# Patient Record
Sex: FEMALE | Race: WHITE | NOT HISPANIC OR LATINO
[De-identification: names, ages, dates, MRNs, and addresses within clinical notes are randomized per-mention and may not be internally consistent; named-entity substitution may affect disease eponyms.]

---

## 2017-01-03 ENCOUNTER — APPOINTMENT (OUTPATIENT)
Dept: CT IMAGING | Facility: CLINIC | Age: 82
End: 2017-01-03

## 2017-01-03 ENCOUNTER — OUTPATIENT (OUTPATIENT)
Dept: OUTPATIENT SERVICES | Facility: HOSPITAL | Age: 82
LOS: 1 days | End: 2017-01-03
Payer: MEDICARE

## 2017-01-03 DIAGNOSIS — Z95.1 PRESENCE OF AORTOCORONARY BYPASS GRAFT: Chronic | ICD-10-CM

## 2017-01-03 DIAGNOSIS — I82.413 ACUTE EMBOLISM AND THROMBOSIS OF FEMORAL VEIN, BILATERAL: Chronic | ICD-10-CM

## 2017-01-03 DIAGNOSIS — Z00.8 ENCOUNTER FOR OTHER GENERAL EXAMINATION: ICD-10-CM

## 2017-01-03 DIAGNOSIS — Z95.0 PRESENCE OF CARDIAC PACEMAKER: Chronic | ICD-10-CM

## 2017-01-03 PROCEDURE — 74160 CT ABDOMEN W/CONTRAST: CPT

## 2017-01-03 PROCEDURE — 82565 ASSAY OF CREATININE: CPT

## 2018-06-12 ENCOUNTER — APPOINTMENT (OUTPATIENT)
Dept: ORTHOPEDIC SURGERY | Facility: CLINIC | Age: 83
End: 2018-06-12
Payer: MEDICARE

## 2018-06-12 VITALS
DIASTOLIC BLOOD PRESSURE: 69 MMHG | BODY MASS INDEX: 30.4 KG/M2 | WEIGHT: 161 LBS | SYSTOLIC BLOOD PRESSURE: 136 MMHG | HEIGHT: 61 IN | HEART RATE: 59 BPM

## 2018-06-12 DIAGNOSIS — Z82.61 FAMILY HISTORY OF ARTHRITIS: ICD-10-CM

## 2018-06-12 DIAGNOSIS — Z86.79 PERSONAL HISTORY OF OTHER DISEASES OF THE CIRCULATORY SYSTEM: ICD-10-CM

## 2018-06-12 DIAGNOSIS — Z60.2 PROBLEMS RELATED TO LIVING ALONE: ICD-10-CM

## 2018-06-12 DIAGNOSIS — Z78.9 OTHER SPECIFIED HEALTH STATUS: ICD-10-CM

## 2018-06-12 DIAGNOSIS — Z86.39 PERSONAL HISTORY OF OTHER ENDOCRINE, NUTRITIONAL AND METABOLIC DISEASE: ICD-10-CM

## 2018-06-12 DIAGNOSIS — C43.9 MALIGNANT MELANOMA OF SKIN, UNSPECIFIED: ICD-10-CM

## 2018-06-12 DIAGNOSIS — M17.0 BILATERAL PRIMARY OSTEOARTHRITIS OF KNEE: ICD-10-CM

## 2018-06-12 DIAGNOSIS — Z87.891 PERSONAL HISTORY OF NICOTINE DEPENDENCE: ICD-10-CM

## 2018-06-12 DIAGNOSIS — Z87.39 PERSONAL HISTORY OF OTHER DISEASES OF THE MUSCULOSKELETAL SYSTEM AND CONNECTIVE TISSUE: ICD-10-CM

## 2018-06-12 DIAGNOSIS — Z80.0 FAMILY HISTORY OF MALIGNANT NEOPLASM OF DIGESTIVE ORGANS: ICD-10-CM

## 2018-06-12 DIAGNOSIS — Z56.0 UNEMPLOYMENT, UNSPECIFIED: ICD-10-CM

## 2018-06-12 PROCEDURE — 73562 X-RAY EXAM OF KNEE 3: CPT | Mod: 50

## 2018-06-12 PROCEDURE — 20610 DRAIN/INJ JOINT/BURSA W/O US: CPT | Mod: 50

## 2018-06-12 PROCEDURE — 99203 OFFICE O/P NEW LOW 30 MIN: CPT | Mod: 25

## 2018-06-12 SDOH — SOCIAL STABILITY - SOCIAL INSECURITY: PROBLEMS RELATED TO LIVING ALONE: Z60.2

## 2018-06-12 SDOH — ECONOMIC STABILITY - INCOME SECURITY: UNEMPLOYMENT, UNSPECIFIED: Z56.0

## 2018-07-06 ENCOUNTER — APPOINTMENT (OUTPATIENT)
Dept: GASTROENTEROLOGY | Facility: CLINIC | Age: 83
End: 2018-07-06
Payer: MEDICARE

## 2018-07-06 VITALS
DIASTOLIC BLOOD PRESSURE: 60 MMHG | HEART RATE: 54 BPM | HEIGHT: 63 IN | BODY MASS INDEX: 27.29 KG/M2 | WEIGHT: 154 LBS | OXYGEN SATURATION: 99 % | SYSTOLIC BLOOD PRESSURE: 138 MMHG

## 2018-07-06 DIAGNOSIS — Q27.33 ARTERIOVENOUS MALFORMATION OF DIGESTIVE SYSTEM VESSEL: ICD-10-CM

## 2018-07-06 PROCEDURE — 99204 OFFICE O/P NEW MOD 45 MIN: CPT

## 2018-08-23 ENCOUNTER — APPOINTMENT (OUTPATIENT)
Dept: GASTROENTEROLOGY | Facility: HOSPITAL | Age: 83
End: 2018-08-23

## 2018-10-30 ENCOUNTER — APPOINTMENT (OUTPATIENT)
Dept: ORTHOPEDIC SURGERY | Facility: CLINIC | Age: 83
End: 2018-10-30

## 2018-10-31 ENCOUNTER — OTHER (OUTPATIENT)
Age: 83
End: 2018-10-31

## 2018-11-01 ENCOUNTER — CHART COPY (OUTPATIENT)
Age: 83
End: 2018-11-01

## 2018-11-05 ENCOUNTER — APPOINTMENT (OUTPATIENT)
Dept: GASTROENTEROLOGY | Facility: CLINIC | Age: 83
End: 2018-11-05
Payer: MEDICARE

## 2018-11-05 ENCOUNTER — OTHER (OUTPATIENT)
Age: 83
End: 2018-11-05

## 2018-11-05 VITALS
HEART RATE: 63 BPM | DIASTOLIC BLOOD PRESSURE: 80 MMHG | SYSTOLIC BLOOD PRESSURE: 110 MMHG | BODY MASS INDEX: 26.22 KG/M2 | WEIGHT: 148 LBS | TEMPERATURE: 97.4 F | HEIGHT: 63 IN | OXYGEN SATURATION: 97 %

## 2018-11-05 DIAGNOSIS — Z87.19 PERSONAL HISTORY OF OTHER DISEASES OF THE DIGESTIVE SYSTEM: ICD-10-CM

## 2018-11-05 DIAGNOSIS — K31.9 DISEASE OF STOMACH AND DUODENUM, UNSPECIFIED: ICD-10-CM

## 2018-11-05 DIAGNOSIS — K63.5 POLYP OF COLON: ICD-10-CM

## 2018-11-05 DIAGNOSIS — D50.0 IRON DEFICIENCY ANEMIA SECONDARY TO BLOOD LOSS (CHRONIC): ICD-10-CM

## 2018-11-05 DIAGNOSIS — Z92.89 PERSONAL HISTORY OF OTHER MEDICAL TREATMENT: ICD-10-CM

## 2018-11-05 PROCEDURE — 99215 OFFICE O/P EST HI 40 MIN: CPT

## 2018-11-05 RX ORDER — DABIGATRAN ETEXILATE MESYLATE 150 MG/1
150 CAPSULE ORAL
Refills: 0 | Status: ACTIVE | COMMUNITY

## 2018-11-23 ENCOUNTER — LABORATORY RESULT (OUTPATIENT)
Age: 83
End: 2018-11-23

## 2018-11-26 ENCOUNTER — OTHER (OUTPATIENT)
Age: 83
End: 2018-11-26

## 2019-10-02 PROBLEM — Z60.2 PERSON LIVING ALONE: Status: ACTIVE | Noted: 2018-06-12

## 2019-10-05 ENCOUNTER — TRANSCRIPTION ENCOUNTER (OUTPATIENT)
Age: 84
End: 2019-10-05

## 2020-03-19 ENCOUNTER — OUTPATIENT (OUTPATIENT)
Dept: OUTPATIENT SERVICES | Facility: HOSPITAL | Age: 85
LOS: 1 days | Discharge: HOME | End: 2020-03-19
Payer: MEDICARE

## 2020-03-19 DIAGNOSIS — J18.9 PNEUMONIA, UNSPECIFIED ORGANISM: ICD-10-CM

## 2020-04-10 ENCOUNTER — OUTPATIENT (OUTPATIENT)
Dept: OUTPATIENT SERVICES | Facility: HOSPITAL | Age: 85
LOS: 1 days | Discharge: HOME | End: 2020-04-10
Payer: MEDICARE

## 2020-04-10 DIAGNOSIS — J18.9 PNEUMONIA, UNSPECIFIED ORGANISM: ICD-10-CM

## 2020-05-08 ENCOUNTER — OUTPATIENT (OUTPATIENT)
Dept: OUTPATIENT SERVICES | Facility: HOSPITAL | Age: 85
LOS: 1 days | Discharge: HOME | End: 2020-05-08
Payer: MEDICARE

## 2020-05-08 DIAGNOSIS — R93.5 ABNORMAL FINDINGS ON DIAGNOSTIC IMAGING OF OTHER ABDOMINAL REGIONS, INCLUDING RETROPERITONEUM: ICD-10-CM

## 2020-05-30 ENCOUNTER — INPATIENT (INPATIENT)
Facility: HOSPITAL | Age: 85
LOS: 4 days | Discharge: HOME HEALTH | End: 2020-06-04
Attending: INTERNAL MEDICINE | Admitting: INTERNAL MEDICINE
Payer: MEDICARE

## 2020-05-30 VITALS
WEIGHT: 76.06 LBS | HEART RATE: 62 BPM | RESPIRATION RATE: 16 BRPM | SYSTOLIC BLOOD PRESSURE: 127 MMHG | DIASTOLIC BLOOD PRESSURE: 57 MMHG | TEMPERATURE: 99 F

## 2020-05-30 DIAGNOSIS — R60.1 GENERALIZED EDEMA: ICD-10-CM

## 2020-05-30 DIAGNOSIS — Z90.49 ACQUIRED ABSENCE OF OTHER SPECIFIED PARTS OF DIGESTIVE TRACT: Chronic | ICD-10-CM
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
## 2020-05-30 DIAGNOSIS — E11.9 TYPE 2 DIABETES MELLITUS WITHOUT COMPLICATIONS: ICD-10-CM

## 2020-05-30 DIAGNOSIS — K74.69 OTHER CIRRHOSIS OF LIVER: ICD-10-CM

## 2020-05-30 DIAGNOSIS — Z95.828 PRESENCE OF OTHER VASCULAR IMPLANTS AND GRAFTS: Chronic | ICD-10-CM

## 2020-05-30 LAB
ALBUMIN SERPL ELPH-MCNC: 3.2 G/DL — LOW (ref 3.3–5)
ALP SERPL-CCNC: 361 U/L — HIGH (ref 40–120)
ALT FLD-CCNC: 34 U/L — SIGNIFICANT CHANGE UP (ref 12–78)
ANION GAP SERPL CALC-SCNC: 9 MMOL/L — SIGNIFICANT CHANGE UP (ref 5–17)
APPEARANCE UR: CLEAR — SIGNIFICANT CHANGE UP
APTT BLD: 37.4 SEC — HIGH (ref 27.5–36.3)
AST SERPL-CCNC: 32 U/L — SIGNIFICANT CHANGE UP (ref 15–37)
BACTERIA # UR AUTO: ABNORMAL
BASOPHILS # BLD AUTO: 0.03 K/UL — SIGNIFICANT CHANGE UP (ref 0–0.2)
BASOPHILS NFR BLD AUTO: 0.8 % — SIGNIFICANT CHANGE UP (ref 0–2)
BILIRUB SERPL-MCNC: 1.3 MG/DL — HIGH (ref 0.2–1.2)
BILIRUB UR-MCNC: NEGATIVE — SIGNIFICANT CHANGE UP
BLD GP AB SCN SERPL QL: SIGNIFICANT CHANGE UP
BUN SERPL-MCNC: 47 MG/DL — HIGH (ref 7–23)
CALCIUM SERPL-MCNC: 9.5 MG/DL — SIGNIFICANT CHANGE UP (ref 8.5–10.1)
CHLORIDE SERPL-SCNC: 103 MMOL/L — SIGNIFICANT CHANGE UP (ref 96–108)
CO2 SERPL-SCNC: 26 MMOL/L — SIGNIFICANT CHANGE UP (ref 22–31)
COLOR SPEC: YELLOW — SIGNIFICANT CHANGE UP
CREAT SERPL-MCNC: 1.89 MG/DL — HIGH (ref 0.5–1.3)
DIFF PNL FLD: NEGATIVE — SIGNIFICANT CHANGE UP
EOSINOPHIL # BLD AUTO: 0.15 K/UL — SIGNIFICANT CHANGE UP (ref 0–0.5)
EOSINOPHIL NFR BLD AUTO: 4.2 % — SIGNIFICANT CHANGE UP (ref 0–6)
EPI CELLS # UR: SIGNIFICANT CHANGE UP
GLUCOSE BLDC GLUCOMTR-MCNC: 106 MG/DL — HIGH (ref 70–99)
GLUCOSE SERPL-MCNC: 127 MG/DL — HIGH (ref 70–99)
GLUCOSE UR QL: NEGATIVE MG/DL — SIGNIFICANT CHANGE UP
HCT VFR BLD CALC: 30.8 % — LOW (ref 34.5–45)
HGB BLD-MCNC: 9.4 G/DL — LOW (ref 11.5–15.5)
IMM GRANULOCYTES NFR BLD AUTO: 0.8 % — SIGNIFICANT CHANGE UP (ref 0–1.5)
INR BLD: 1.36 RATIO — HIGH (ref 0.88–1.16)
KETONES UR-MCNC: NEGATIVE — SIGNIFICANT CHANGE UP
LEUKOCYTE ESTERASE UR-ACNC: ABNORMAL
LYMPHOCYTES # BLD AUTO: 0.54 K/UL — LOW (ref 1–3.3)
LYMPHOCYTES # BLD AUTO: 15 % — SIGNIFICANT CHANGE UP (ref 13–44)
MAGNESIUM SERPL-MCNC: 2.6 MG/DL — SIGNIFICANT CHANGE UP (ref 1.6–2.6)
MCHC RBC-ENTMCNC: 29 PG — SIGNIFICANT CHANGE UP (ref 27–34)
MCHC RBC-ENTMCNC: 30.5 GM/DL — LOW (ref 32–36)
MCV RBC AUTO: 95.1 FL — SIGNIFICANT CHANGE UP (ref 80–100)
MONOCYTES # BLD AUTO: 0.37 K/UL — SIGNIFICANT CHANGE UP (ref 0–0.9)
MONOCYTES NFR BLD AUTO: 10.3 % — SIGNIFICANT CHANGE UP (ref 2–14)
NEUTROPHILS # BLD AUTO: 2.48 K/UL — SIGNIFICANT CHANGE UP (ref 1.8–7.4)
NEUTROPHILS NFR BLD AUTO: 68.9 % — SIGNIFICANT CHANGE UP (ref 43–77)
NITRITE UR-MCNC: NEGATIVE — SIGNIFICANT CHANGE UP
NRBC # BLD: 0 /100 WBCS — SIGNIFICANT CHANGE UP (ref 0–0)
PH UR: 6 — SIGNIFICANT CHANGE UP (ref 5–8)
PLATELET # BLD AUTO: 117 K/UL — LOW (ref 150–400)
POTASSIUM SERPL-MCNC: 4.4 MMOL/L — SIGNIFICANT CHANGE UP (ref 3.5–5.3)
POTASSIUM SERPL-SCNC: 4.4 MMOL/L — SIGNIFICANT CHANGE UP (ref 3.5–5.3)
PROT SERPL-MCNC: 7.1 GM/DL — SIGNIFICANT CHANGE UP (ref 6–8.3)
PROT UR-MCNC: NEGATIVE MG/DL — SIGNIFICANT CHANGE UP
PROTHROM AB SERPL-ACNC: 15.2 SEC — HIGH (ref 10–12.9)
RBC # BLD: 3.24 M/UL — LOW (ref 3.8–5.2)
RBC # FLD: 16.8 % — HIGH (ref 10.3–14.5)
RBC CASTS # UR COMP ASSIST: SIGNIFICANT CHANGE UP /HPF (ref 0–4)
SARS-COV-2 RNA SPEC QL NAA+PROBE: SIGNIFICANT CHANGE UP
SODIUM SERPL-SCNC: 138 MMOL/L — SIGNIFICANT CHANGE UP (ref 135–145)
SP GR SPEC: 1.01 — SIGNIFICANT CHANGE UP (ref 1.01–1.02)
UROBILINOGEN FLD QL: NEGATIVE MG/DL — SIGNIFICANT CHANGE UP
WBC # BLD: 3.6 K/UL — LOW (ref 3.8–10.5)
WBC # FLD AUTO: 3.6 K/UL — LOW (ref 3.8–10.5)
WBC UR QL: SIGNIFICANT CHANGE UP

## 2020-05-30 RX ORDER — SPIRONOLACTONE 25 MG/1
50 TABLET, FILM COATED ORAL DAILY
Refills: 0 | Status: DISCONTINUED | OUTPATIENT
Start: 2020-05-30 | End: 2020-06-04

## 2020-05-30 RX ORDER — DEXTROSE 50 % IN WATER 50 %
25 SYRINGE (ML) INTRAVENOUS ONCE
Refills: 0 | Status: DISCONTINUED | OUTPATIENT
Start: 2020-05-30 | End: 2020-06-04

## 2020-05-30 RX ORDER — CHOLECALCIFEROL (VITAMIN D3) 125 MCG
1000 CAPSULE ORAL DAILY
Refills: 0 | Status: DISCONTINUED | OUTPATIENT
Start: 2020-05-30 | End: 2020-06-04

## 2020-05-30 RX ORDER — IOHEXOL 300 MG/ML
30 INJECTION, SOLUTION INTRAVENOUS ONCE
Refills: 0 | Status: COMPLETED | OUTPATIENT
Start: 2020-05-30 | End: 2020-05-30

## 2020-05-30 RX ORDER — ASPIRIN/CALCIUM CARB/MAGNESIUM 324 MG
81 TABLET ORAL DAILY
Refills: 0 | Status: DISCONTINUED | OUTPATIENT
Start: 2020-05-31 | End: 2020-06-04

## 2020-05-30 RX ORDER — NYSTATIN CREAM 100000 [USP'U]/G
1 CREAM TOPICAL
Refills: 0 | Status: DISCONTINUED | OUTPATIENT
Start: 2020-05-30 | End: 2020-06-04

## 2020-05-30 RX ORDER — SENNA PLUS 8.6 MG/1
0 TABLET ORAL
Qty: 0 | Refills: 0 | DISCHARGE

## 2020-05-30 RX ORDER — INSULIN LISPRO 100/ML
VIAL (ML) SUBCUTANEOUS AT BEDTIME
Refills: 0 | Status: DISCONTINUED | OUTPATIENT
Start: 2020-05-30 | End: 2020-06-04

## 2020-05-30 RX ORDER — HEPARIN SODIUM 5000 [USP'U]/ML
5000 INJECTION INTRAVENOUS; SUBCUTANEOUS EVERY 12 HOURS
Refills: 0 | Status: DISCONTINUED | OUTPATIENT
Start: 2020-05-30 | End: 2020-06-04

## 2020-05-30 RX ORDER — METOPROLOL TARTRATE 50 MG
25 TABLET ORAL
Refills: 0 | Status: DISCONTINUED | OUTPATIENT
Start: 2020-05-30 | End: 2020-06-04

## 2020-05-30 RX ORDER — CLOPIDOGREL BISULFATE 75 MG/1
75 TABLET, FILM COATED ORAL DAILY
Refills: 0 | Status: DISCONTINUED | OUTPATIENT
Start: 2020-05-30 | End: 2020-06-04

## 2020-05-30 RX ORDER — ATORVASTATIN CALCIUM 80 MG/1
20 TABLET, FILM COATED ORAL AT BEDTIME
Refills: 0 | Status: DISCONTINUED | OUTPATIENT
Start: 2020-05-30 | End: 2020-06-04

## 2020-05-30 RX ORDER — ALBUTEROL 90 UG/1
2 AEROSOL, METERED ORAL EVERY 6 HOURS
Refills: 0 | Status: DISCONTINUED | OUTPATIENT
Start: 2020-05-30 | End: 2020-06-04

## 2020-05-30 RX ORDER — POLYETHYLENE GLYCOL 3350 17 G/17G
17 POWDER, FOR SOLUTION ORAL
Refills: 0 | Status: DISCONTINUED | OUTPATIENT
Start: 2020-05-30 | End: 2020-06-04

## 2020-05-30 RX ORDER — SODIUM CHLORIDE 9 MG/ML
1000 INJECTION, SOLUTION INTRAVENOUS
Refills: 0 | Status: DISCONTINUED | OUTPATIENT
Start: 2020-05-30 | End: 2020-06-04

## 2020-05-30 RX ORDER — ACETAMINOPHEN 500 MG
2 TABLET ORAL
Qty: 0 | Refills: 0 | DISCHARGE

## 2020-05-30 RX ORDER — DEXTROSE 50 % IN WATER 50 %
15 SYRINGE (ML) INTRAVENOUS ONCE
Refills: 0 | Status: DISCONTINUED | OUTPATIENT
Start: 2020-05-30 | End: 2020-06-04

## 2020-05-30 RX ORDER — PANTOPRAZOLE SODIUM 20 MG/1
40 TABLET, DELAYED RELEASE ORAL
Refills: 0 | Status: DISCONTINUED | OUTPATIENT
Start: 2020-05-30 | End: 2020-06-04

## 2020-05-30 RX ORDER — TAMSULOSIN HYDROCHLORIDE 0.4 MG/1
0.4 CAPSULE ORAL AT BEDTIME
Refills: 0 | Status: DISCONTINUED | OUTPATIENT
Start: 2020-05-30 | End: 2020-06-04

## 2020-05-30 RX ORDER — MORPHINE SULFATE 50 MG/1
4 CAPSULE, EXTENDED RELEASE ORAL ONCE
Refills: 0 | Status: DISCONTINUED | OUTPATIENT
Start: 2020-05-30 | End: 2020-05-30

## 2020-05-30 RX ORDER — SODIUM CHLORIDE 9 MG/ML
1000 INJECTION INTRAMUSCULAR; INTRAVENOUS; SUBCUTANEOUS ONCE
Refills: 0 | Status: COMPLETED | OUTPATIENT
Start: 2020-05-30 | End: 2020-05-30

## 2020-05-30 RX ORDER — INSULIN LISPRO 100/ML
VIAL (ML) SUBCUTANEOUS
Refills: 0 | Status: DISCONTINUED | OUTPATIENT
Start: 2020-05-30 | End: 2020-06-04

## 2020-05-30 RX ORDER — ASCORBIC ACID 60 MG
500 TABLET,CHEWABLE ORAL DAILY
Refills: 0 | Status: DISCONTINUED | OUTPATIENT
Start: 2020-05-30 | End: 2020-06-04

## 2020-05-30 RX ORDER — GLUCAGON INJECTION, SOLUTION 0.5 MG/.1ML
1 INJECTION, SOLUTION SUBCUTANEOUS ONCE
Refills: 0 | Status: DISCONTINUED | OUTPATIENT
Start: 2020-05-30 | End: 2020-06-04

## 2020-05-30 RX ORDER — ONDANSETRON 8 MG/1
4 TABLET, FILM COATED ORAL ONCE
Refills: 0 | Status: COMPLETED | OUTPATIENT
Start: 2020-05-30 | End: 2020-05-30

## 2020-05-30 RX ORDER — DEXTROSE 50 % IN WATER 50 %
12.5 SYRINGE (ML) INTRAVENOUS ONCE
Refills: 0 | Status: DISCONTINUED | OUTPATIENT
Start: 2020-05-30 | End: 2020-06-04

## 2020-05-30 RX ADMIN — MORPHINE SULFATE 4 MILLIGRAM(S): 50 CAPSULE, EXTENDED RELEASE ORAL at 17:02

## 2020-05-30 RX ADMIN — IOHEXOL 30 MILLILITER(S): 300 INJECTION, SOLUTION INTRAVENOUS at 17:02

## 2020-05-30 RX ADMIN — SODIUM CHLORIDE 1000 MILLILITER(S): 9 INJECTION INTRAMUSCULAR; INTRAVENOUS; SUBCUTANEOUS at 17:02

## 2020-05-30 RX ADMIN — ATORVASTATIN CALCIUM 20 MILLIGRAM(S): 80 TABLET, FILM COATED ORAL at 21:29

## 2020-05-30 RX ADMIN — TAMSULOSIN HYDROCHLORIDE 0.4 MILLIGRAM(S): 0.4 CAPSULE ORAL at 21:28

## 2020-05-30 RX ADMIN — SPIRONOLACTONE 50 MILLIGRAM(S): 25 TABLET, FILM COATED ORAL at 21:26

## 2020-05-30 RX ADMIN — Medication 500 MILLIGRAM(S): at 21:29

## 2020-05-30 RX ADMIN — Medication 1 ENEMA: at 22:09

## 2020-05-30 RX ADMIN — ONDANSETRON 4 MILLIGRAM(S): 8 TABLET, FILM COATED ORAL at 17:02

## 2020-05-30 NOTE — H&P ADULT - NSHPLABSRESULTS_GEN_ALL_CORE
9.4                  138  | 26   | 47           3.60  >-----------< 117     ------------------------< 127                   30.8                 4.4  | 103  | 1.89                                         Ca 9.5   Mg 2.6   Ph x      PT/INR - ( 30 May 2020 16:58 )   PT: 15.2 sec;   INR: 1.36 ratio         PTT - ( 30 May 2020 16:58 )  PTT:37.4 sec  EKG - A. fib, RBBB LAHB  rate 61    < from: CT Abdomen and Pelvis No Cont (05.30.20 @ 17:44) >      EXAM:  CT ABDOMEN AND PELVIS                            PROCEDURE DATE:  05/30/2020          INTERPRETATION:  Clinical Information: Abdominal pain    Comparison: None    Technique: Noncontrast CT abdomen and pelvis with Axial, sagittal, coronal reconstructions. Oral contrast administered.    Findings:    Imaged chest: Severe cardiomegaly. Atherosclerosis of the coronary arteries. Defibrillator. Trace bilateral pleural effusions.  Hepatobiliary: Cirrhosis of the liver. Biliary tree unremarkable.Cholelithiasis without distention or inflammation.  Spleen: Unremarkable.  Pancreas: Unremarkable.  Adrenal glands: Unremarkable.  Urinary: Left kidney is atrophic. Mild cystic bladder wall thickening.  Reproductive organs: unremarkable.    Gastrointestinal: No bowel obstruction.    Peritoneum: Trace ascites.  Vasculature: Severe atherosclerotic changes. Status post aorto-bilateral common femoral bypass grafts.  Retroperitoneum: Unremarkable.  Subcutaneous tissues: Anasarca. Prior abdominal wall surgery.  Neuromusculoskeletal: Severe degenerative changes.    Impression:   -Severe cardiomegaly and coronary artery atherosclerosis.  -Severe abdominal atherosclerotic disease. Status post aorto-bilateral common femoral bypass grafts.  -Hepatomegaly and cirrhosis.  -Mild urinary bladder wall thickening, correlate for cystitis.  -Severe degenerative changes of the spine.    < end of copied text >

## 2020-05-30 NOTE — H&P ADULT - NSHPPHYSICALEXAM_GEN_ALL_CORE
General: A/ox 3, No acute Distress  skin : Normal  Head. Anisha. No lacerations  Neck: Supple, NO JVD  Cardiac: S1 S2, No M/R/G  Pulmonary: CTAP, Breathing unlabored, No Rhonchi/Rales/Wheezing  Abdomen: Soft, Non -tender, +BS x 4 quads  Neuro: A/o x 3, No focal deficits. Normal Motor and sensory exam  Vascular: Normal distal pulses.  Extremities: . No rashes.  edema of abd wall and legs  Decubiti ; None

## 2020-05-30 NOTE — ED ADULT NURSE NOTE - NSIMPLEMENTINTERV_GEN_ALL_ED
Implemented All Fall with Harm Risk Interventions:  [place] to call system. Call bell, personal items and telephone within reach. Instruct patient to call for assistance. Room bathroom lighting operational. Non-slip footwear when patient is off stretcher. Physically safe environment: no spills, clutter or unnecessary equipment. Stretcher in lowest position, wheels locked, appropriate side rails in place. Provide visual cue, wrist band, yellow gown, etc. Monitor gait and stability. Monitor for mental status changes and reorient to person, place, and time. Review medications for side effects contributing to fall risk. Reinforce activity limits and safety measures with patient and family. Provide visual clues: red socks.

## 2020-05-30 NOTE — ED ADULT TRIAGE NOTE - CHIEF COMPLAINT QUOTE
pt was send from [facility] for impaction and abdominal  obstruction.

## 2020-05-30 NOTE — H&P ADULT - NSICDXPASTMEDICALHX_GEN_ALL_CORE_FT
----- Message from Katy Majano MD sent at 10/26/2017 12:57 PM CDT -----  The vaginal cultures were negative for infection.      PAST MEDICAL HISTORY:  Cardiac complication Cad s/p cabg    Falls     H/O cirrhosis

## 2020-05-30 NOTE — ED PROVIDER NOTE - CLINICAL SUMMARY MEDICAL DECISION MAKING FREE TEXT BOX
Bowel obstruction vs Constipation Bowel obstruction vs Constipation. ct shows neither, rather appears to be in liver cirrhosis. will admit for gi eval and pain control Bowel obstruction vs Constipation. ct shows neither, rather appears to be in liver cirrhosis. will admit for gi eval and pain control  I read ekg as afib rate 61, no st elevation or depression, rbbb, lafb, inferior qs, qtc 491.

## 2020-05-30 NOTE — H&P ADULT - HISTORY OF PRESENT ILLNESS
· HPI Objective Statement: 85 F y/o hx of bl aorto femoral bypass, cad, htn, gerd presents with complains of abdominal pain. Pt states she has noticed her abdomen has been distended and has gotten progressively worse with time. She states she has been moving bowel normally. She states she had COVID for an estimation of 5 weeks but at this time does not have it anymore.

## 2020-05-30 NOTE — H&P ADULT - NSHPREVIEWOFSYSTEMS_GEN_ALL_CORE
REVIEW OF SYSTEMS:    CONSTITUTIONAL: Generalized  weakness, fevers or chills. Anasarca  EYES/ENT: No visual changes;  No vertigo or throat pain   NECK: No pain or stiffness  RESPIRATORY: No cough, wheezing, hemoptysis; No shortness of breath  CARDIOVASCULAR: No chest pain or palpitations [ ] CHF [ ] MI [ ] CABG [x ]  GASTROINTESTINAL: No abdominal or epigastric pain. No nausea, vomiting, or hematemesis; No diarrhea or constipation. No melena or hematochezia. [ ]abdominal surgery [ ] prostrate problems   GENITOURINARY: No dysuria, frequency or hematuria   NEUROLOGICAL: No numbness or weakness. No hx of seizures  SKIN: No itching, burning, rashes, or lesions   All other review of systems is negative unless indicated above.

## 2020-05-30 NOTE — ED PROVIDER NOTE - PHYSICAL EXAMINATION
Gen: Alert, NAD  Head: NC, AT   Eyes: PERRL, EOMI, normal lids/conjunctiva  ENT: normal hearing, patent oropharynx without erythema/exudate, uvula midline  Neck: supple, no tenderness, Trachea midline  Pulm: Bilateral BS, normal resp effort, no wheeze/stridor/retractions  CV: RRR, no M/R/G, 2+ radial and dp pulses bl, no edema  Abd: abdomen distended, tympanic, and tender to palpations   Mskel: extremities x4 with normal ROM and no joint effusions. no ctl spine ttp.   Skin: no rash, no bruising   Neuro: AAOx3, no sensory/motor deficits, CN 2-12 intact

## 2020-05-30 NOTE — ED PROVIDER NOTE - OBJECTIVE STATEMENT
86 y/o F  presents with complains of abdominal pain. Pt states she has noticed her abdomen has been distended and has gotten progressively worse with time. She states she has been moving bowel normally. She states she had COVID for an estimation of 5 weeks but at this time does not have it anymore. 85 F y/o hx of bl aorto femoral bypass, cad, htn, gerd presents with complains of abdominal pain. Pt states she has noticed her abdomen has been distended and has gotten progressively worse with time. She states she has been moving bowel normally. She states she had COVID for an estimation of 5 weeks but at this time does not have it anymore.

## 2020-05-30 NOTE — H&P ADULT - ASSESSMENT
anasarca   CAd, s/p CABG   PAd s/p [place] aorto femoral Bypass   Diabetes  HTn   Recentt Covid infection

## 2020-05-31 LAB
A1C WITH ESTIMATED AVERAGE GLUCOSE RESULT: 6.4 % — HIGH (ref 4–5.6)
ANION GAP SERPL CALC-SCNC: 8 MMOL/L — SIGNIFICANT CHANGE UP (ref 5–17)
BUN SERPL-MCNC: 46 MG/DL — HIGH (ref 7–23)
CALCIUM SERPL-MCNC: 9.6 MG/DL — SIGNIFICANT CHANGE UP (ref 8.5–10.1)
CHLORIDE SERPL-SCNC: 104 MMOL/L — SIGNIFICANT CHANGE UP (ref 96–108)
CO2 SERPL-SCNC: 26 MMOL/L — SIGNIFICANT CHANGE UP (ref 22–31)
CREAT SERPL-MCNC: 1.64 MG/DL — HIGH (ref 0.5–1.3)
ESTIMATED AVERAGE GLUCOSE: 137 MG/DL — HIGH (ref 68–114)
GLUCOSE BLDC GLUCOMTR-MCNC: 109 MG/DL — HIGH (ref 70–99)
GLUCOSE BLDC GLUCOMTR-MCNC: 143 MG/DL — HIGH (ref 70–99)
GLUCOSE BLDC GLUCOMTR-MCNC: 153 MG/DL — HIGH (ref 70–99)
GLUCOSE BLDC GLUCOMTR-MCNC: 154 MG/DL — HIGH (ref 70–99)
GLUCOSE SERPL-MCNC: 95 MG/DL — SIGNIFICANT CHANGE UP (ref 70–99)
HCT VFR BLD CALC: 30.7 % — LOW (ref 34.5–45)
HCV AB S/CO SERPL IA: 0.3 S/CO — SIGNIFICANT CHANGE UP (ref 0–0.99)
HCV AB SERPL-IMP: SIGNIFICANT CHANGE UP
HGB BLD-MCNC: 9.1 G/DL — LOW (ref 11.5–15.5)
MCHC RBC-ENTMCNC: 29.1 PG — SIGNIFICANT CHANGE UP (ref 27–34)
MCHC RBC-ENTMCNC: 29.6 GM/DL — LOW (ref 32–36)
MCV RBC AUTO: 98.1 FL — SIGNIFICANT CHANGE UP (ref 80–100)
NRBC # BLD: 0 /100 WBCS — SIGNIFICANT CHANGE UP (ref 0–0)
PLATELET # BLD AUTO: 115 K/UL — LOW (ref 150–400)
POTASSIUM SERPL-MCNC: 4.1 MMOL/L — SIGNIFICANT CHANGE UP (ref 3.5–5.3)
POTASSIUM SERPL-SCNC: 4.1 MMOL/L — SIGNIFICANT CHANGE UP (ref 3.5–5.3)
RBC # BLD: 3.13 M/UL — LOW (ref 3.8–5.2)
RBC # FLD: 16.9 % — HIGH (ref 10.3–14.5)
SODIUM SERPL-SCNC: 138 MMOL/L — SIGNIFICANT CHANGE UP (ref 135–145)
WBC # BLD: 3.42 K/UL — LOW (ref 3.8–10.5)
WBC # FLD AUTO: 3.42 K/UL — LOW (ref 3.8–10.5)

## 2020-05-31 RX ORDER — ACETAMINOPHEN 500 MG
650 TABLET ORAL ONCE
Refills: 0 | Status: COMPLETED | OUTPATIENT
Start: 2020-05-31 | End: 2020-05-31

## 2020-05-31 RX ORDER — SIMETHICONE 80 MG/1
80 TABLET, CHEWABLE ORAL
Refills: 0 | Status: DISCONTINUED | OUTPATIENT
Start: 2020-05-31 | End: 2020-06-04

## 2020-05-31 RX ADMIN — Medication 1000 UNIT(S): at 12:06

## 2020-05-31 RX ADMIN — CLOPIDOGREL BISULFATE 75 MILLIGRAM(S): 75 TABLET, FILM COATED ORAL at 12:06

## 2020-05-31 RX ADMIN — ALBUTEROL 2 PUFF(S): 90 AEROSOL, METERED ORAL at 21:54

## 2020-05-31 RX ADMIN — SPIRONOLACTONE 50 MILLIGRAM(S): 25 TABLET, FILM COATED ORAL at 05:42

## 2020-05-31 RX ADMIN — TAMSULOSIN HYDROCHLORIDE 0.4 MILLIGRAM(S): 0.4 CAPSULE ORAL at 21:27

## 2020-05-31 RX ADMIN — Medication 25 MILLIGRAM(S): at 18:23

## 2020-05-31 RX ADMIN — NYSTATIN CREAM 1 APPLICATION(S): 100000 CREAM TOPICAL at 05:43

## 2020-05-31 RX ADMIN — PANTOPRAZOLE SODIUM 40 MILLIGRAM(S): 20 TABLET, DELAYED RELEASE ORAL at 08:02

## 2020-05-31 RX ADMIN — SIMETHICONE 80 MILLIGRAM(S): 80 TABLET, CHEWABLE ORAL at 18:21

## 2020-05-31 RX ADMIN — Medication 25 MILLIGRAM(S): at 05:42

## 2020-05-31 RX ADMIN — ATORVASTATIN CALCIUM 20 MILLIGRAM(S): 80 TABLET, FILM COATED ORAL at 21:27

## 2020-05-31 RX ADMIN — Medication 650 MILLIGRAM(S): at 21:27

## 2020-05-31 RX ADMIN — Medication 81 MILLIGRAM(S): at 12:06

## 2020-05-31 RX ADMIN — NYSTATIN CREAM 1 APPLICATION(S): 100000 CREAM TOPICAL at 16:35

## 2020-05-31 RX ADMIN — Medication 500 MILLIGRAM(S): at 12:06

## 2020-05-31 NOTE — CONSULT NOTE ADULT - ASSESSMENT
HPI:  · HPI Objective Statement: 85 F y/o hx of bl aorto femoral bypass, cad, htn, gerd presents with complains of abdominal pain. Pt states she has noticed her abdomen has been distended and has gotten progressively worse with time. She states she has been moving bowel normally. She states she had COVID for an estimation of 5 weeks but at this time does not have it anymore. (30 May 2020 19:18)  --------------------- As Above ----------------------------------  Patient is a poor historian . Patient presents with upper abdominal pain best described as bloating. She feels like someone is trying to push out from inside the stomach. Started (?). History of diarrhea alternating with constipation. Patient denies having any BMs while in the hospital  Patient had a CT Scan on October 10/15/19 c/w cirrhosis On admission, patient had a CT Scan which revealed cirrhosis with anasarca. Mild amount of ascites is present.   Denies having alicia prior liver disease history. Denies ETOH abuse.  Patient denies having a colonoscopy  See labs / CT scan    Abdominal pain - Distention (+) , r/o ileus, food intolerance , obstruction, PUD  1) KUB  2) ? EGD  3) simethicone 4) clear liquid for now  Cirrhosis - probably secondary to NAFLD VS congestive liver 1) Additional blood work 2) old chart HPI:  · HPI Objective Statement: 85 F y/o hx of bl aorto femoral bypass, cad, htn, gerd presents with complains of abdominal pain. Pt states she has noticed her abdomen has been distended and has gotten progressively worse with time. She states she has been moving bowel normally. She states she had COVID for an estimation of 5 weeks but at this time does not have it anymore. (30 May 2020 19:18)  --------------------- As Above ----------------------------------  Patient is a poor historian . Patient presents with upper abdominal pain best described as bloating. She feels like someone is trying to push out from inside the stomach. Started (?). History of diarrhea alternating with constipation. Patient denies having any BMs while in the hospital  Patient had a CT Scan on October 10/15/19 c/w cirrhosis On admission, patient had a CT Scan which revealed cirrhosis with anasarca. Mild amount of ascites is present.   Denies having alicia prior liver disease history. Denies ETOH abuse.  Patient denies having a colonoscopy  See labs / CT scan    Abdominal pain - Distention (+) , r/o ileus, food intolerance , obstruction, PUD  1) KUB  2) ? EGD  3) simethicone 4) clear liquid for now 5) D/C ASA  Cirrhosis - probably secondary to NAFLD VS congestive liver 1) Additional blood work 2) old chart

## 2020-05-31 NOTE — PROGRESS NOTE ADULT - SUBJECTIVE AND OBJECTIVE BOX
Patient is a 85y old  Female who presents with a chief complaint of Anasarca, Diabetes, Cirrhosis of liver, HTN (30 May 2020 19:18)  Bladder scan 108 ml only.    Fleet enema did not produce much result..    patient has no acute distress. . Vitals stable.    INTERVAL HPI/OVERNIGHT EVENTS:  PAST MEDICAL & SURGICAL HISTORY:  H/O cirrhosis  Falls  Cardiac complication: Cad s/p cabg  H/O aorto-femoral bypass: Bilateral  S/P cholecystectomy      MEDICATIONS  (STANDING):  ascorbic acid 500 milliGRAM(s) Oral daily  aspirin enteric coated 81 milliGRAM(s) Oral daily  atorvastatin 20 milliGRAM(s) Oral at bedtime  cholecalciferol 1000 Unit(s) Oral daily  clopidogrel Tablet 75 milliGRAM(s) Oral daily  dextrose 5%. 1000 milliLiter(s) (50 mL/Hr) IV Continuous <Continuous>  dextrose 50% Injectable 12.5 Gram(s) IV Push once  dextrose 50% Injectable 25 Gram(s) IV Push once  dextrose 50% Injectable 25 Gram(s) IV Push once  heparin   Injectable 5000 Unit(s) SubCutaneous every 12 hours  insulin lispro (HumaLOG) corrective regimen sliding scale   SubCutaneous three times a day before meals  insulin lispro (HumaLOG) corrective regimen sliding scale   SubCutaneous at bedtime  metoprolol tartrate 25 milliGRAM(s) Oral two times a day  nystatin Powder 1 Application(s) Topical two times a day  pantoprazole    Tablet 40 milliGRAM(s) Oral before breakfast  spironolactone 50 milliGRAM(s) Oral daily  tamsulosin 0.4 milliGRAM(s) Oral at bedtime    MEDICATIONS  (PRN):  ALBUTerol    90 MICROgram(s) HFA Inhaler 2 Puff(s) Inhalation every 6 hours PRN Shortness of Breath and/or Wheezing  dextrose 40% Gel 15 Gram(s) Oral once PRN Blood Glucose LESS THAN 70 milliGRAM(s)/deciliter  glucagon  Injectable 1 milliGRAM(s) IntraMuscular once PRN Glucose LESS THAN 70 milligrams/deciliter  polyethylene glycol 3350 17 Gram(s) Oral two times a day PRN Constipation      Allergies    penicillin (Unknown)    Intolerances        REVIEW OF SYSTEMS:  CONSTITUTIONAL: No fever, weight loss, or fatigue  EYES: No eye pain, visual disturbances, or discharge  ENMT:  No difficulty hearing, tinnitus, vertigo; No sinus or throat pain  NECK: No pain or stiffness  BREASTS: No pain, masses, or nipple discharge  RESPIRATORY: No cough, wheezing, chills or hemoptysis; No shortness of breath  CARDIOVASCULAR: No chest pain, palpitations, dizziness, or leg swelling  GASTROINTESTINAL: No abdominal or epigastric pain. No nausea, vomiting, or hematemesis; No diarrhea or constipation. No melena or hematochezia.  GENITOURINARY: No dysuria, frequency, hematuria, or incontinence  NEUROLOGICAL: No headaches, memory loss, loss of strength, numbness, or tremors  SKIN: No itching, burning, rashes, or lesions   LYMPH NODES: No enlarged glands  ENDOCRINE: No heat or cold intolerance; No hair loss  MUSCULOSKELETAL: No joint pain or swelling; No muscle, back, or extremity pain  PSYCHIATRIC: No depression, anxiety, mood swings, or difficulty sleeping  HEME/LYMPH: No easy bruising, or bleeding gums  ALLERY AND IMMUNOLOGIC: No hives or eczema    Vital Signs Last 24 Hrs  T(C): 36.6 (31 May 2020 05:16), Max: 37 (30 May 2020 16:13)  T(F): 97.9 (31 May 2020 05:16), Max: 98.6 (30 May 2020 16:13)  HR: 70 (31 May 2020 05:16) (62 - 76)  BP: 111/51 (31 May 2020 05:16) (111/51 - 128/63)  BP(mean): --  RR: 18 (31 May 2020 05:16) (16 - 18)  SpO2: 97% (31 May 2020 05:16) (97% - 98%)    PHYSICAL EXAM:  GENERAL: NAD, well-groomed, well-developed  HEAD:  Atraumatic, Normocephalic  EYES: EOMI, PERRLA, conjunctiva and sclera clear  ENMT: No tonsillar erythema, exudates, or enlargement; Moist mucous membranes, Good dentition, No lesions  NECK: Supple, No JVD, Normal thyroid  NERVOUS SYSTEM:  Alert & Oriented X3, Good concentration; Motor Strength 5/5 B/L upper and lower extremities; DTRs 2+ intact and symmetric  CHEST/LUNG: Clear to percussion bilaterally; No rales, rhonchi, wheezing, or rubs  HEART: Regular rate and rhythm; No murmurs, rubs, or gallops  ABDOMEN: Soft, Nontender, Nondistended; Bowel sounds present  EXTREMITIES:  2+ Peripheral Pulses, No clubbing, cyanosis, or edema  LYMPH: No lymphadenopathy noted  SKIN: No rashes or lesions    LABS:                        9.1    3.42  )-----------( 115      ( 31 May 2020 07:14 )             30.7     05-31    138  |  104  |  46<H>  ----------------------------<  95  4.1   |  26  |  1.64<H>    Ca    9.6      31 May 2020 07:14  Mg     2.6         TPro  7.1  /  Alb  3.2<L>  /  TBili  1.3<H>  /  DBili  x   /  AST  32  /  ALT  34  /  AlkPhos  361<H>  0530      PT/INR - ( 30 May 2020 16:58 )   PT: 15.2 sec;   INR: 1.36 ratio         PTT - ( 30 May 2020 16:58 )  PTT:37.4 sec  Urinalysis Basic - ( 30 May 2020 18:58 )    Color: Yellow / Appearance: Clear / S.010 / pH: x  Gluc: x / Ketone: Negative  / Bili: Negative / Urobili: Negative mg/dL   Blood: x / Protein: Negative mg/dL / Nitrite: Negative   Leuk Esterase: Moderate / RBC: 0-2 /HPF / WBC 3-5   Sq Epi: x / Non Sq Epi: Few / Bacteria: Moderate      CAPILLARY BLOOD GLUCOSE      POCT Blood Glucose.: 109 mg/dL (31 May 2020 08:02)  POCT Blood Glucose.: 106 mg/dL (30 May 2020 21:33)                RADIOLOGY & ADDITIONAL TESTS:    Imaging Personally Reviewed:  [ ] YES  [ ] NO    Consultant(s) Notes Reviewed:  [ ] YES  [ ] NO    Care Discussed with Consultants/Other Providers [ ] YES  [ ] NO    Care discussed with family,         [  ]   yes  [  ]  No    imp:    stable[ ]    unstable[  ]     improving [   ]       unchanged  [x  ]                Plans:  Continue present plans  [x ]               New consult [  ]   specialty  ....GI...               order test[  ]    test name. labs in am / hep c antibody                Discharge Planning  [  ]

## 2020-05-31 NOTE — CONSULT NOTE ADULT - SUBJECTIVE AND OBJECTIVE BOX
HPI:  · HPI Objective Statement: 85 F y/o hx of bl aorto femoral bypass, cad, htn, gerd presents with complains of abdominal pain. Pt states she has noticed her abdomen has been distended and has gotten progressively worse with time. She states she has been moving bowel normally. She states she had COVID for an estimation of 5 weeks but at this time does not have it anymore. (30 May 2020 19:18)  --------------------- As Above ----------------------------------  Patient is a poor historian . Patient presents with upper abdominal pain best described as       PAST MEDICAL & SURGICAL HISTORY:  H/O cirrhosis  Falls  Cardiac complication: Cad s/p cabg  H/O aorto-femoral bypass: Bilaeral  S/P cholecystectomy      MEDICATIONS  (STANDING):  ascorbic acid 500 milliGRAM(s) Oral daily  aspirin enteric coated 81 milliGRAM(s) Oral daily  atorvastatin 20 milliGRAM(s) Oral at bedtime  cholecalciferol 1000 Unit(s) Oral daily  clopidogrel Tablet 75 milliGRAM(s) Oral daily  dextrose 5%. 1000 milliLiter(s) (50 mL/Hr) IV Continuous <Continuous>  dextrose 50% Injectable 12.5 Gram(s) IV Push once  dextrose 50% Injectable 25 Gram(s) IV Push once  dextrose 50% Injectable 25 Gram(s) IV Push once  heparin   Injectable 5000 Unit(s) SubCutaneous every 12 hours  insulin lispro (HumaLOG) corrective regimen sliding scale   SubCutaneous three times a day before meals  insulin lispro (HumaLOG) corrective regimen sliding scale   SubCutaneous at bedtime  metoprolol tartrate 25 milliGRAM(s) Oral two times a day  nystatin Powder 1 Application(s) Topical two times a day  pantoprazole    Tablet 40 milliGRAM(s) Oral before breakfast  spironolactone 50 milliGRAM(s) Oral daily  tamsulosin 0.4 milliGRAM(s) Oral at bedtime    MEDICATIONS  (PRN):  ALBUTerol    90 MICROgram(s) HFA Inhaler 2 Puff(s) Inhalation every 6 hours PRN Shortness of Breath and/or Wheezing  dextrose 40% Gel 15 Gram(s) Oral once PRN Blood Glucose LESS THAN 70 milliGRAM(s)/deciliter  glucagon  Injectable 1 milliGRAM(s) IntraMuscular once PRN Glucose LESS THAN 70 milligrams/deciliter  polyethylene glycol 3350 17 Gram(s) Oral two times a day PRN Constipation      Allergies    penicillin (Unknown)    Intolerances        FAMILY HISTORY:      REVIEW OF SYSTEMS:    CONSTITUTIONAL: No fever, weight loss, or fatigue  EYES: No eye pain, visual disturbances, or discharge  ENMT:  No difficulty hearing, tinnitus, vertigo; No sinus or throat pain  NECK: No pain or stiffness  BREASTS: No pain, masses, or nipple discharge  RESPIRATORY: No cough, wheezing, chills or hemoptysis; No shortness of breath  CARDIOVASCULAR: No chest pain, palpitations, dizziness, or leg swelling  GASTROINTESTINAL: No abdominal or epigastric pain. No nausea, vomiting, or hematemesis; No diarrhea or constipation. No melena or hematochezia.  GENITOURINARY: No dysuria, frequency, hematuria, or incontinence  NEUROLOGICAL: No headaches, memory loss, loss of strength, numbness, or tremors  SKIN: No itching, burning, rashes, or lesions   LYMPH NODES: No enlarged glands  ENDOCRINE: No heat or cold intolerance; No hair loss  MUSCULOSKELETAL: No joint pain or swelling; No muscle, back, or extremity pain  PSYCHIATRIC: No depression, anxiety, mood swings, or difficulty sleeping  HEME/LYMPH: No easy bruising, or bleeding gums  ALLERGY AND IMMUNOLOGIC: No hives or eczema          SOCIAL HISTORY:    FAMILY HISTORY:      Vital Signs Last 24 Hrs  T(C): 36.6 (31 May 2020 05:16), Max: 37 (30 May 2020 16:13)  T(F): 97.9 (31 May 2020 05:16), Max: 98.6 (30 May 2020 16:13)  HR: 70 (31 May 2020 05:16) (62 - 76)  BP: 111/51 (31 May 2020 05:16) (111/51 - 128/63)  BP(mean): --  RR: 18 (31 May 2020 05:16) (16 - 18)  SpO2: 97% (31 May 2020 05:16) (97% - 98%)    PHYSICAL EXAM:    GENERAL: NAD, well-groomed, well-developed  HEAD:  Atraumatic, Normocephalic  EYES: EOMI, PERRLA, conjunctiva and sclera clear  ENMT: No tonsillar erythema, exudates, or enlargement; Moist mucous membranes, Good dentition, No lesions  NECK: Supple, No JVD, Normal thyroid  NERVOUS SYSTEM:  Alert & Oriented X3, Good concentration; Motor Strength 5/5 B/L upper and lower extremities; DTRs 2+ intact and symmetric  CHEST/LUNG: Clear to percussion bilaterally; No rales, rhonchi, wheezing, or rubs  HEART: Regular rate and rhythm; No murmurs, rubs, or gallops  ABDOMEN: Soft, Nontender, Nondistended; Bowel sounds present  EXTREMITIES:  2+ Peripheral Pulses, No clubbing, cyanosis, or edema  LYMPH: No lymphadenopathy noted   RECTAL: No masses, prostate normal size and smooth, Guaiaci negative   BREAST: No palpable masses, skin no lesions no retractions, no discharges. adnexal no palpable masses noted   GYN: uterus normal size, adnexal, no palpable masses, no CMT, no uterine discharge   SKIN: No rashes or lesions    LABS:                        9.1    3.42  )-----------( 115      ( 31 May 2020 07:14 )             30.7       CBC:   @ 07:14  WBC  3.42  HGB 9.1  HCT 30.7 Plate 115  MCV 98.1   @ 16:58  WBC  3.60  HGB 9.4  HCT 30.8 Plate 117  MCV 95.1           31 May 2020 07:14    138    |  104    |  46     ----------------------------<  95     4.1     |  26     |  1.64   30 May 2020 16:58    138    |  103    |  47     ----------------------------<  127    4.4     |  26     |  1.89     Ca    9.6        31 May 2020 07:14  Ca    9.5        30 May 2020 16:58  Mg     2.6       30 May 2020 16:58    TPro  7.1    /  Alb  3.2    /  TBili  1.3    /  DBili  x      /  AST  32     /  ALT  34     /  AlkPhos  361    30 May 2020 16:58    PT/INR - ( 30 May 2020 16:58 )   PT: 15.2 sec;   INR: 1.36 ratio         PTT - ( 30 May 2020 16:58 )  PTT:37.4 sec  Urinalysis Basic - ( 30 May 2020 18:58 )    Color: Yellow / Appearance: Clear / S.010 / pH: x  Gluc: x / Ketone: Negative  / Bili: Negative / Urobili: Negative mg/dL   Blood: x / Protein: Negative mg/dL / Nitrite: Negative   Leuk Esterase: Moderate / RBC: 0-2 /HPF / WBC 3-5   Sq Epi: x / Non Sq Epi: Few / Bacteria: Moderate          RADIOLOGY & ADDITIONAL STUDIES: HPI:  · HPI Objective Statement: 85 F y/o hx of bl aorto femoral bypass, cad, htn, gerd presents with complains of abdominal pain. Pt states she has noticed her abdomen has been distended and has gotten progressively worse with time. She states she has been moving bowel normally. She states she had COVID for an estimation of 5 weeks but at this time does not have it anymore. (30 May 2020 19:18)  --------------------- As Above ----------------------------------  History obtained from patient and daughter  Patient is a poor historian . Patient presents with upper abdominal pain best described as bloating. She feels like someone is trying to push out from inside the stomach. Started (?). History of diarrhea alternating with constipation. Patient denies having any BMs while in the hospital  Patient had a CT Scan on October 10/15/19 c/w cirrhosis On admission, patient had a CT Scan which revealed cirrhosis with anasarca. Mild amount of ascites is present.   Denies having alicia prior liver disease history. Denies ETOH abuse.  Patient denies having a colonoscopy  See labs / CT scan      PAST MEDICAL & SURGICAL HISTORY:  H/O cirrhosis  Falls  Cardiac complication: Cad s/p cabg  H/O aorto-femoral bypass: Bilaeral  S/P cholecystectomy  ****      MEDICATIONS  (STANDING):  ascorbic acid 500 milliGRAM(s) Oral daily  aspirin enteric coated 81 milliGRAM(s) Oral daily  atorvastatin 20 milliGRAM(s) Oral at bedtime  cholecalciferol 1000 Unit(s) Oral daily  clopidogrel Tablet 75 milliGRAM(s) Oral daily  dextrose 5%. 1000 milliLiter(s) (50 mL/Hr) IV Continuous <Continuous>  dextrose 50% Injectable 12.5 Gram(s) IV Push once  dextrose 50% Injectable 25 Gram(s) IV Push once  dextrose 50% Injectable 25 Gram(s) IV Push once  heparin   Injectable 5000 Unit(s) SubCutaneous every 12 hours  insulin lispro (HumaLOG) corrective regimen sliding scale   SubCutaneous three times a day before meals  insulin lispro (HumaLOG) corrective regimen sliding scale   SubCutaneous at bedtime  metoprolol tartrate 25 milliGRAM(s) Oral two times a day  nystatin Powder 1 Application(s) Topical two times a day  pantoprazole    Tablet 40 milliGRAM(s) Oral before breakfast  spironolactone 50 milliGRAM(s) Oral daily  tamsulosin 0.4 milliGRAM(s) Oral at bedtime    MEDICATIONS  (PRN):  ALBUTerol    90 MICROgram(s) HFA Inhaler 2 Puff(s) Inhalation every 6 hours PRN Shortness of Breath and/or Wheezing  dextrose 40% Gel 15 Gram(s) Oral once PRN Blood Glucose LESS THAN 70 milliGRAM(s)/deciliter  glucagon  Injectable 1 milliGRAM(s) IntraMuscular once PRN Glucose LESS THAN 70 milligrams/deciliter  polyethylene glycol 3350 17 Gram(s) Oral two times a day PRN Constipation      Allergies    penicillin (Unknown)    Intolerances        FAMILY HISTORY:      REVIEW OF SYSTEMS:    CONSTITUTIONAL: No fever, weight loss,   EYES: No eye pain, visual disturbances, or discharge  ENMT:  No difficulty hearing, tinnitus, vertigo; No sinus or throat pain  NECK: No pain or stiffness  BREASTS: No pain, masses, or nipple discharge  RESPIRATORY: No cough, wheezing, chills or hemoptysis; No shortness of breath  CARDIOVASCULAR: No chest pain, palpitations, dizziness, or leg swelling  GASTROINTESTINAL: See above  GENITOURINARY: No dysuria, frequency, hematuria, or incontinence  NEUROLOGICAL: No headaches,  numbness, or tremors  SKIN: No itching, burning, rashes, or lesions   LYMPH NODES: No enlarged glands  ENDOCRINE: No heat or cold intolerance; No hair loss  MUSCULOSKELETAL: No joint pain or swelling; No muscle, back, or extremity pain  PSYCHIATRIC: No depression, anxiety, mood swings, or difficulty sleeping  HEME/LYMPH: No easy bruising, or bleeding gums  ALLERGY AND IMMUNOLOGIC: No hives or eczema          SOCIAL HISTORY:    FAMILY HISTORY:      Vital Signs Last 24 Hrs  T(C): 36.6 (31 May 2020 05:16), Max: 37 (30 May 2020 16:13)  T(F): 97.9 (31 May 2020 05:16), Max: 98.6 (30 May 2020 16:13)  HR: 70 (31 May 2020 05:16) (62 - 76)  BP: 111/51 (31 May 2020 05:16) (111/51 - 128/63)  BP(mean): --  RR: 18 (31 May 2020 05:16) (16 - 18)  SpO2: 97% (31 May 2020 05:16) (97% - 98%)    PHYSICAL EXAM:    GENERAL: NAD, well-groomed, well-developed  HEAD:  Atraumatic, Normocephalic  EYES: EOMI, PERRLA, conjunctiva and sclera clear  NECK: Supple, No JVD, Normal thyroid  NERVOUS SYSTEM:  Alert & Oriented X3, Good concentration; Motor Strength 5/5 B/L upper and lower extremities;   CHEST/LUNG: Clear to percussion bilaterally; No rales, rhonchi, wheezing, or rubs  HEART: Regular rate and rhythm; No murmurs, rubs, or gallops  ABDOMEN: Soft, Nontender, Distended; Bowel sounds present  EXTREMITIES:  2+ Peripheral Pulses, No clubbing, cyanosis, or edema  LYMPH: No lymphadenopathy noted   RECTAL: Deferred   SKIN: No rashes or lesions    LABS:                        9.1    3.42  )-----------( 115      ( 31 May 2020 07:14 )             30.7       CBC:   @ 07:14  WBC  3.42  HGB 9.1  HCT 30.7 Plate 115  MCV 98.1   @ 16:58  WBC  3.60  HGB 9.4  HCT 30.8 Plate 117  MCV 95.1           31 May 2020 07:14    138    |  104    |  46     ----------------------------<  95     4.1     |  26     |  1.64   30 May 2020 16:58    138    |  103    |  47     ----------------------------<  127    4.4     |  26     |  1.89     Ca    9.6        31 May 2020 07:14  Ca    9.5        30 May 2020 16:58  Mg     2.6       30 May 2020 16:58    TPro  7.1    /  Alb  3.2    /  TBili  1.3    /  DBili  x      /  AST  32     /  ALT  34     /  AlkPhos  361    30 May 2020 16:58    PT/INR - ( 30 May 2020 16:58 )   PT: 15.2 sec;   INR: 1.36 ratio         PTT - ( 30 May 2020 16:58 )  PTT:37.4 sec  Urinalysis Basic - ( 30 May 2020 18:58 )    Color: Yellow / Appearance: Clear / S.010 / pH: x  Gluc: x / Ketone: Negative  / Bili: Negative / Urobili: Negative mg/dL   Blood: x / Protein: Negative mg/dL / Nitrite: Negative   Leuk Esterase: Moderate / RBC: 0-2 /HPF / WBC 3-5   Sq Epi: x / Non Sq Epi: Few / Bacteria: Moderate          RADIOLOGY & ADDITIONAL STUDIES:  < from: CT Abdomen and Pelvis No Cont (20 @ 17:44) >    EXAM:  CT ABDOMEN AND PELVIS                            PROCEDURE DATE:  2020          INTERPRETATION:  Clinical Information: Abdominal pain    Comparison: None    Technique: Noncontrast CT abdomen and pelvis with Axial, sagittal, coronal reconstructions. Oral contrast administered.    Findings:    Imaged chest: Severe cardiomegaly. Atherosclerosis of the coronary arteries. Defibrillator. Trace bilateral pleural effusions.  Hepatobiliary: Cirrhosis of the liver. Biliary tree unremarkable.Cholelithiasis without distention or inflammation.  Spleen: Unremarkable.  Pancreas: Unremarkable.  Adrenal glands: Unremarkable.  Urinary: Left kidney is atrophic. Mild cystic bladder wall thickening.  Reproductive organs: unremarkable.    Gastrointestinal: No bowel obstruction.    Peritoneum: Trace ascites.  Vasculature: Severe atherosclerotic changes. Status post aorto-bilateral common femoral bypass grafts.  Retroperitoneum: Unremarkable.  Subcutaneous tissues: Anasarca. Prior abdominal wall surgery.  Neuromusculoskeletal: Severe degenerative changes.    Impression:   -Severe cardiomegaly and coronary artery atherosclerosis.  -Severe abdominal atherosclerotic disease. Status post aorto-bilateral common femoral bypass grafts.  -Hepatomegaly and cirrhosis.  -Mild urinary bladder wall thickening, correlate for cystitis.  -Severe degenerative changes of the spine.                  MARNI JOHNSON M.D., ATTENDING RADIOLOGIST  This document has been electronically signed. May 30 2020  6:00PM                < end of copied text >

## 2020-06-01 LAB
ANION GAP SERPL CALC-SCNC: 8 MMOL/L — SIGNIFICANT CHANGE UP (ref 5–17)
BUN SERPL-MCNC: 46 MG/DL — HIGH (ref 7–23)
CALCIUM SERPL-MCNC: 9.6 MG/DL — SIGNIFICANT CHANGE UP (ref 8.5–10.1)
CHLORIDE SERPL-SCNC: 105 MMOL/L — SIGNIFICANT CHANGE UP (ref 96–108)
CO2 SERPL-SCNC: 27 MMOL/L — SIGNIFICANT CHANGE UP (ref 22–31)
CREAT SERPL-MCNC: 1.76 MG/DL — HIGH (ref 0.5–1.3)
GLUCOSE BLDC GLUCOMTR-MCNC: 124 MG/DL — HIGH (ref 70–99)
GLUCOSE BLDC GLUCOMTR-MCNC: 135 MG/DL — HIGH (ref 70–99)
GLUCOSE BLDC GLUCOMTR-MCNC: 146 MG/DL — HIGH (ref 70–99)
GLUCOSE BLDC GLUCOMTR-MCNC: 153 MG/DL — HIGH (ref 70–99)
GLUCOSE SERPL-MCNC: 107 MG/DL — HIGH (ref 70–99)
HAV IGM SER-ACNC: SIGNIFICANT CHANGE UP
HBV CORE IGM SER-ACNC: SIGNIFICANT CHANGE UP
HBV SURFACE AG SER-ACNC: SIGNIFICANT CHANGE UP
HCT VFR BLD CALC: 30.8 % — LOW (ref 34.5–45)
HCV AB S/CO SERPL IA: 0.28 S/CO — SIGNIFICANT CHANGE UP (ref 0–0.99)
HCV AB SERPL-IMP: SIGNIFICANT CHANGE UP
HGB BLD-MCNC: 9.4 G/DL — LOW (ref 11.5–15.5)
MCHC RBC-ENTMCNC: 29.2 PG — SIGNIFICANT CHANGE UP (ref 27–34)
MCHC RBC-ENTMCNC: 30.5 GM/DL — LOW (ref 32–36)
MCV RBC AUTO: 95.7 FL — SIGNIFICANT CHANGE UP (ref 80–100)
NRBC # BLD: 0 /100 WBCS — SIGNIFICANT CHANGE UP (ref 0–0)
PLATELET # BLD AUTO: 111 K/UL — LOW (ref 150–400)
POTASSIUM SERPL-MCNC: 4.5 MMOL/L — SIGNIFICANT CHANGE UP (ref 3.5–5.3)
POTASSIUM SERPL-SCNC: 4.5 MMOL/L — SIGNIFICANT CHANGE UP (ref 3.5–5.3)
RBC # BLD: 3.22 M/UL — LOW (ref 3.8–5.2)
RBC # FLD: 16.8 % — HIGH (ref 10.3–14.5)
SODIUM SERPL-SCNC: 140 MMOL/L — SIGNIFICANT CHANGE UP (ref 135–145)
WBC # BLD: 3.31 K/UL — LOW (ref 3.8–10.5)
WBC # FLD AUTO: 3.31 K/UL — LOW (ref 3.8–10.5)

## 2020-06-01 RX ORDER — SENNA PLUS 8.6 MG/1
2 TABLET ORAL AT BEDTIME
Refills: 0 | Status: DISCONTINUED | OUTPATIENT
Start: 2020-06-01 | End: 2020-06-04

## 2020-06-01 RX ORDER — ACETAMINOPHEN 500 MG
650 TABLET ORAL ONCE
Refills: 0 | Status: COMPLETED | OUTPATIENT
Start: 2020-06-01 | End: 2020-06-01

## 2020-06-01 RX ADMIN — HEPARIN SODIUM 5000 UNIT(S): 5000 INJECTION INTRAVENOUS; SUBCUTANEOUS at 18:08

## 2020-06-01 RX ADMIN — SIMETHICONE 80 MILLIGRAM(S): 80 TABLET, CHEWABLE ORAL at 18:09

## 2020-06-01 RX ADMIN — SENNA PLUS 2 TABLET(S): 8.6 TABLET ORAL at 22:24

## 2020-06-01 RX ADMIN — SPIRONOLACTONE 50 MILLIGRAM(S): 25 TABLET, FILM COATED ORAL at 05:28

## 2020-06-01 RX ADMIN — TAMSULOSIN HYDROCHLORIDE 0.4 MILLIGRAM(S): 0.4 CAPSULE ORAL at 22:24

## 2020-06-01 RX ADMIN — Medication 500 MILLIGRAM(S): at 11:59

## 2020-06-01 RX ADMIN — CLOPIDOGREL BISULFATE 75 MILLIGRAM(S): 75 TABLET, FILM COATED ORAL at 11:59

## 2020-06-01 RX ADMIN — Medication 650 MILLIGRAM(S): at 13:02

## 2020-06-01 RX ADMIN — Medication 2: at 16:46

## 2020-06-01 RX ADMIN — HEPARIN SODIUM 5000 UNIT(S): 5000 INJECTION INTRAVENOUS; SUBCUTANEOUS at 05:29

## 2020-06-01 RX ADMIN — Medication 1000 UNIT(S): at 11:59

## 2020-06-01 RX ADMIN — ATORVASTATIN CALCIUM 20 MILLIGRAM(S): 80 TABLET, FILM COATED ORAL at 22:30

## 2020-06-01 RX ADMIN — Medication 25 MILLIGRAM(S): at 05:29

## 2020-06-01 RX ADMIN — Medication 81 MILLIGRAM(S): at 11:59

## 2020-06-01 RX ADMIN — SIMETHICONE 80 MILLIGRAM(S): 80 TABLET, CHEWABLE ORAL at 05:29

## 2020-06-01 RX ADMIN — PANTOPRAZOLE SODIUM 40 MILLIGRAM(S): 20 TABLET, DELAYED RELEASE ORAL at 05:32

## 2020-06-01 RX ADMIN — NYSTATIN CREAM 1 APPLICATION(S): 100000 CREAM TOPICAL at 05:32

## 2020-06-01 RX ADMIN — NYSTATIN CREAM 1 APPLICATION(S): 100000 CREAM TOPICAL at 18:09

## 2020-06-01 RX ADMIN — Medication 25 MILLIGRAM(S): at 18:08

## 2020-06-01 NOTE — PROGRESS NOTE ADULT - SUBJECTIVE AND OBJECTIVE BOX
Patient is a 85y old  Female who presents with a chief complaint of Anasarca, Diabetes, Cirrhosis of liver, HTN (31 May 2020 11:11)  GI notes appreciated.   no fever   ckd stable       INTERVAL HPI/OVERNIGHT EVENTS:  PAST MEDICAL & SURGICAL HISTORY:  H/O cirrhosis  Falls  Cardiac complication: Cad s/p cabg  H/O aorto-femoral bypass: Bilaeral  S/P cholecystectomy      MEDICATIONS  (STANDING):  ascorbic acid 500 milliGRAM(s) Oral daily  aspirin enteric coated 81 milliGRAM(s) Oral daily  atorvastatin 20 milliGRAM(s) Oral at bedtime  cholecalciferol 1000 Unit(s) Oral daily  clopidogrel Tablet 75 milliGRAM(s) Oral daily  dextrose 5%. 1000 milliLiter(s) (50 mL/Hr) IV Continuous <Continuous>  dextrose 50% Injectable 12.5 Gram(s) IV Push once  dextrose 50% Injectable 25 Gram(s) IV Push once  dextrose 50% Injectable 25 Gram(s) IV Push once  heparin   Injectable 5000 Unit(s) SubCutaneous every 12 hours  insulin lispro (HumaLOG) corrective regimen sliding scale   SubCutaneous three times a day before meals  insulin lispro (HumaLOG) corrective regimen sliding scale   SubCutaneous at bedtime  metoprolol tartrate 25 milliGRAM(s) Oral two times a day  nystatin Powder 1 Application(s) Topical two times a day  pantoprazole    Tablet 40 milliGRAM(s) Oral before breakfast  simethicone 80 milliGRAM(s) Chew two times a day  spironolactone 50 milliGRAM(s) Oral daily  tamsulosin 0.4 milliGRAM(s) Oral at bedtime    MEDICATIONS  (PRN):  ALBUTerol    90 MICROgram(s) HFA Inhaler 2 Puff(s) Inhalation every 6 hours PRN Shortness of Breath and/or Wheezing  dextrose 40% Gel 15 Gram(s) Oral once PRN Blood Glucose LESS THAN 70 milliGRAM(s)/deciliter  glucagon  Injectable 1 milliGRAM(s) IntraMuscular once PRN Glucose LESS THAN 70 milligrams/deciliter  polyethylene glycol 3350 17 Gram(s) Oral two times a day PRN Constipation      Allergies    penicillin (Unknown)    Intolerances        REVIEW OF SYSTEMS:  CONSTITUTIONAL: No fever, weight loss, or fatigue  EYES: No eye pain, visual disturbances, or discharge  ENMT:  No difficulty hearing, tinnitus, vertigo; No sinus or throat pain  NECK: No pain or stiffness  BREASTS: No pain, masses, or nipple discharge  RESPIRATORY: No cough, wheezing, chills or hemoptysis; No shortness of breath  CARDIOVASCULAR: No chest pain, palpitations, dizziness, or leg swelling  GASTROINTESTINAL: No abdominal or epigastric pain. No nausea, vomiting, or hematemesis; No diarrhea or constipation. No melena or hematochezia.  GENITOURINARY: No dysuria, frequency, hematuria, or incontinence  NEUROLOGICAL: No headaches, memory loss, loss of strength, numbness, or tremors  SKIN: No itching, burning, rashes, or lesions   LYMPH NODES: No enlarged glands  ENDOCRINE: No heat or cold intolerance; No hair loss  MUSCULOSKELETAL: No joint pain or swelling; No muscle, back, or extremity pain  PSYCHIATRIC: No depression, anxiety, mood swings, or difficulty sleeping  HEME/LYMPH: No easy bruising, or bleeding gums  ALLERY AND IMMUNOLOGIC: No hives or eczema    Vital Signs Last 24 Hrs  T(C): 36.4 (2020 05:01), Max: 36.7 (31 May 2020 11:23)  T(F): 97.6 (2020 05:01), Max: 98 (31 May 2020 11:23)  HR: 64 (2020 05:01) (64 - 80)  BP: 121/58 (2020 05:01) (112/60 - 141/55)  BP(mean): --  RR: 18 (2020 05:01) (17 - 18)  SpO2: 98% (2020 05:01) (97% - 100%)    PHYSICAL EXAM:  GENERAL: NAD, well-groomed, well-developed  HEAD:  Atraumatic, Normocephalic  EYES: EOMI, PERRLA, conjunctiva and sclera clear  ENMT: No tonsillar erythema, exudates, or enlargement; Moist mucous membranes, Good dentition, No lesions  NECK: Supple, No JVD, Normal thyroid  NERVOUS SYSTEM:  Alert & Oriented X3, Good concentration; Motor Strength 5/5 B/L upper and lower extremities; DTRs 2+ intact and symmetric  CHEST/LUNG: Clear to percussion bilaterally; No rales, rhonchi, wheezing, or rubs  HEART: Regular rate and rhythm; No murmurs, rubs, or gallops  ABDOMEN: Soft, Nontender, Nondistended; Bowel sounds present  EXTREMITIES:  2+ Peripheral Pulses, No clubbing, cyanosis, or edema  LYMPH: No lymphadenopathy noted  SKIN: No rashes or lesions    LABS:                        9.4    3.31  )-----------( 111      ( 2020 08:17 )             30.8     06-01    140  |  105  |  46<H>  ----------------------------<  107<H>  4.5   |  27  |  1.76<H>    Ca    9.6      2020 08:17  Mg     2.6     05-30    TPro  7.1  /  Alb  3.2<L>  /  TBili  1.3<H>  /  DBili  x   /  AST  32  /  ALT  34  /  AlkPhos  361<H>  05-30      PT/INR - ( 30 May 2020 16:58 )   PT: 15.2 sec;   INR: 1.36 ratio         PTT - ( 30 May 2020 16:58 )  PTT:37.4 sec  Urinalysis Basic - ( 30 May 2020 18:58 )    Color: Yellow / Appearance: Clear / S.010 / pH: x  Gluc: x / Ketone: Negative  / Bili: Negative / Urobili: Negative mg/dL   Blood: x / Protein: Negative mg/dL / Nitrite: Negative   Leuk Esterase: Moderate / RBC: 0-2 /HPF / WBC 3-5   Sq Epi: x / Non Sq Epi: Few / Bacteria: Moderate      CAPILLARY BLOOD GLUCOSE      POCT Blood Glucose.: 124 mg/dL (2020 07:22)  POCT Blood Glucose.: 153 mg/dL (31 May 2020 22:02)  POCT Blood Glucose.: 154 mg/dL (31 May 2020 16:33)  POCT Blood Glucose.: 143 mg/dL (31 May 2020 12:04)                RADIOLOGY & ADDITIONAL TESTS:    Imaging Personally Reviewed:  [ ] YES  [ ] NO    Consultant(s) Notes Reviewed:  [ ] YES  [ ] NO    Care Discussed with Consultants/Other Providers [ ] YES  [ ] NO    Care discussed with family,         [  ]   yes  [  ]  No    imp:    stable[ ]    unstable[  ]     improving [   ]       unchanged  [x  ]                Plans:  Continue present plans  [ x ]               New consult [  ]   specialty  .....renal..               order test[  ]    test name.   renal ultrasound                Discharge Planning  [  ]

## 2020-06-01 NOTE — PROGRESS NOTE ADULT - SUBJECTIVE AND OBJECTIVE BOX
Patient is a 85y old  Female who presents with a chief complaint of Anasarca, Diabetes, Cirrhosis of liver, HTN (2020 10:01)      HPI:  · HPI Objective Statement: 85 F y/o hx of bl aorto femoral bypass, cad, htn, gerd presents with complains of abdominal pain. Pt states she has noticed her abdomen has been distended and has gotten progressively worse with time. She states she has been moving bowel normally. She states she had COVID for an estimation of 5 weeks but at this time does not have it anymore. (30 May 2020 19:18)      INTERVAL HPI/OVERNIGHT EVENTS:  Patient complains of severe abdominal pain on palpation of abdominal skin. Patient sitting in chair comfortably. The patient denies melena, hematochezia, hematemesis, nausea, vomiting, constipation, diarrhea, or change in bowel movements     MEDICATIONS  (STANDING):  ascorbic acid 500 milliGRAM(s) Oral daily  aspirin enteric coated 81 milliGRAM(s) Oral daily  atorvastatin 20 milliGRAM(s) Oral at bedtime  cholecalciferol 1000 Unit(s) Oral daily  clopidogrel Tablet 75 milliGRAM(s) Oral daily  dextrose 5%. 1000 milliLiter(s) (50 mL/Hr) IV Continuous <Continuous>  dextrose 50% Injectable 12.5 Gram(s) IV Push once  dextrose 50% Injectable 25 Gram(s) IV Push once  dextrose 50% Injectable 25 Gram(s) IV Push once  heparin   Injectable 5000 Unit(s) SubCutaneous every 12 hours  insulin lispro (HumaLOG) corrective regimen sliding scale   SubCutaneous three times a day before meals  insulin lispro (HumaLOG) corrective regimen sliding scale   SubCutaneous at bedtime  metoprolol tartrate 25 milliGRAM(s) Oral two times a day  nystatin Powder 1 Application(s) Topical two times a day  pantoprazole    Tablet 40 milliGRAM(s) Oral before breakfast  simethicone 80 milliGRAM(s) Chew two times a day  spironolactone 50 milliGRAM(s) Oral daily  tamsulosin 0.4 milliGRAM(s) Oral at bedtime    MEDICATIONS  (PRN):  ALBUTerol    90 MICROgram(s) HFA Inhaler 2 Puff(s) Inhalation every 6 hours PRN Shortness of Breath and/or Wheezing  dextrose 40% Gel 15 Gram(s) Oral once PRN Blood Glucose LESS THAN 70 milliGRAM(s)/deciliter  glucagon  Injectable 1 milliGRAM(s) IntraMuscular once PRN Glucose LESS THAN 70 milligrams/deciliter  polyethylene glycol 3350 17 Gram(s) Oral two times a day PRN Constipation      FAMILY HISTORY:      Allergies    penicillin (Unknown)    Intolerances        PMH/PSH:  H/O cirrhosis  Falls  Cardiac complication  H/O aorto-femoral bypass  S/P cholecystectomy        REVIEW OF SYSTEMS:  CONSTITUTIONAL: No fever, weight loss,   EYES: No eye pain, visual disturbances, or discharge  ENMT:  No difficulty hearing, tinnitus, vertigo; No sinus or throat pain  NECK: No pain or stiffness  BREASTS: No pain, masses, or nipple discharge  RESPIRATORY: No cough, wheezing, chills or hemoptysis; No shortness of breath  CARDIOVASCULAR: No chest pain, palpitations, dizziness, or leg swelling  GASTROINTESTINAL: As above  GENITOURINARY: No dysuria, frequency, hematuria, or incontinence  NEUROLOGICAL: No headaches, memory loss, loss of strength, numbness, or tremors  SKIN: No itching, burning, rashes, or lesions   LYMPH NODES: No enlarged glands  ENDOCRINE: No heat or cold intolerance; No hair loss  MUSCULOSKELETAL: No joint pain or swelling; No muscle, back, or extremity pain  PSYCHIATRIC: No depression, anxiety, mood swings, or difficulty sleeping  HEME/LYMPH: No easy bruising, or bleeding gums  ALLERGY AND IMMUNOLOGIC: No hives or eczema    Vital Signs Last 24 Hrs  T(C): 36.7 (2020 11:14), Max: 36.7 (2020 11:14)  T(F): 98 (2020 11:14), Max: 98 (2020 11:14)  HR: 63 (2020 11:14) (63 - 65)  BP: 116/50 (2020 11:14) (116/50 - 141/55)  BP(mean): --  RR: 17 (2020 11:14) (17 - 18)  SpO2: 96% (2020 11:14) (96% - 100%)    PHYSICAL EXAM:  Patient sitting, refuses to go to bed  GENERAL: NAD, well-groomed, well-developed  HEAD:  Atraumatic, Normocephalic  EYES: EOMI, PERRLA, conjunctiva and sclera clear  NECK: Supple, No JVD, Normal thyroid  NERVOUS SYSTEM:  Alert & Oriented X3, Good concentration;   CHEST/LUNG: Clear to percussion bilaterally; No rales, rhonchi, wheezing, or rubs  HEART: Regular rate and rhythm; No murmurs, rubs, or gallops  ABDOMEN: Soft, Nontender, Distended (+) ; Bowel sounds present  EXTREMITIES:  2+ Peripheral Pulses, No clubbing, cyanosis, or edema  LYMPH: No lymphadenopathy noted  SKIN: No rashes or lesions    LAB                          9.4    3.31  )-----------( 111      ( 2020 08:17 )             30.8       CBC:   @ 08:17  WBC 3.31   Hgb 9.4   Hct 30.8   Plts 111  MCV 95.7   @ 07:14  WBC 3.42   Hgb 9.1   Hct 30.7   Plts 115  MCV 98.1   @ 16:58  WBC 3.60   Hgb 9.4   Hct 30.8   Plts 117  MCV 95.1      Chemistry:   @ 08:17  Na+ 140  K+ 4.5  Cl- 105  CO2 27  BUN 46  Cr 1.76      @ 07:14  Na+ 138  K+ 4.1  Cl- 104  CO2 26  BUN 46  Cr 1.64      @ 16:58  Na+ 138  K+ 4.4  Cl- 103  CO2 26  BUN 47  Cr 1.89         Glucose, Serum: 107 mg/dL ( @ 08:17)  Glucose, Serum: 95 mg/dL ( @ 07:14)  Glucose, Serum: 127 mg/dL ( @ 16:58)      2020 08:17    140    |  105    |  46     ----------------------------<  107    4.5     |  27     |  1.76   31 May 2020 07:14    138    |  104    |  46     ----------------------------<  95     4.1     |  26     |  1.64   30 May 2020 16:58    138    |  103    |  47     ----------------------------<  127    4.4     |  26     |  1.89     Ca    9.6        2020 08:17  Ca    9.6        31 May 2020 07:14  Ca    9.5        30 May 2020 16:58  Mg     2.6       30 May 2020 16:58    TPro  7.1    /  Alb  3.2    /  TBili  1.3    /  DBili  x      /  AST  32     /  ALT  34     /  AlkPhos  361    30 May 2020 16:58      PT/INR - ( 30 May 2020 16:58 )   PT: 15.2 sec;   INR: 1.36 ratio         PTT - ( 30 May 2020 16:58 )  PTT:37.4 sec    Urinalysis Basic - ( 30 May 2020 18:58 )    Color: Yellow / Appearance: Clear / S.010 / pH: x  Gluc: x / Ketone: Negative  / Bili: Negative / Urobili: Negative mg/dL   Blood: x / Protein: Negative mg/dL / Nitrite: Negative   Leuk Esterase: Moderate / RBC: 0-2 /HPF / WBC 3-5   Sq Epi: x / Non Sq Epi: Few / Bacteria: Moderate        CAPILLARY BLOOD GLUCOSE      POCT Blood Glucose.: 135 mg/dL (2020 11:04)  POCT Blood Glucose.: 124 mg/dL (2020 07:22)  POCT Blood Glucose.: 153 mg/dL (31 May 2020 22:02)  POCT Blood Glucose.: 154 mg/dL (31 May 2020 16:33)          RADIOLOGY & ADDITIONAL TESTS:    Imaging Personally Reviewed:  [ ] YES  [ ] NO    Consultant(s) Notes Reviewed:  [ ] YES  [ ] NO    Care Discussed with Consultants/Other Providers [ ] YES  [ ] NO

## 2020-06-01 NOTE — DIETITIAN INITIAL EVALUATION ADULT. - PERTINENT MEDS FT
MEDICATIONS  (STANDING):  ascorbic acid 500 milliGRAM(s) Oral daily  aspirin enteric coated 81 milliGRAM(s) Oral daily  atorvastatin 20 milliGRAM(s) Oral at bedtime  cholecalciferol 1000 Unit(s) Oral daily  clopidogrel Tablet 75 milliGRAM(s) Oral daily  dextrose 5%. 1000 milliLiter(s) (50 mL/Hr) IV Continuous <Continuous>  dextrose 50% Injectable 12.5 Gram(s) IV Push once  dextrose 50% Injectable 25 Gram(s) IV Push once  dextrose 50% Injectable 25 Gram(s) IV Push once  heparin   Injectable 5000 Unit(s) SubCutaneous every 12 hours  insulin lispro (HumaLOG) corrective regimen sliding scale   SubCutaneous three times a day before meals  insulin lispro (HumaLOG) corrective regimen sliding scale   SubCutaneous at bedtime  metoprolol tartrate 25 milliGRAM(s) Oral two times a day  nystatin Powder 1 Application(s) Topical two times a day  pantoprazole    Tablet 40 milliGRAM(s) Oral before breakfast  simethicone 80 milliGRAM(s) Chew two times a day  spironolactone 50 milliGRAM(s) Oral daily  tamsulosin 0.4 milliGRAM(s) Oral at bedtime    MEDICATIONS  (PRN):  ALBUTerol    90 MICROgram(s) HFA Inhaler 2 Puff(s) Inhalation every 6 hours PRN Shortness of Breath and/or Wheezing  dextrose 40% Gel 15 Gram(s) Oral once PRN Blood Glucose LESS THAN 70 milliGRAM(s)/deciliter  glucagon  Injectable 1 milliGRAM(s) IntraMuscular once PRN Glucose LESS THAN 70 milligrams/deciliter  polyethylene glycol 3350 17 Gram(s) Oral two times a day PRN Constipation

## 2020-06-01 NOTE — PHYSICAL THERAPY INITIAL EVALUATION ADULT - PERTINENT HX OF CURRENT PROBLEM, REHAB EVAL
Patient admitted with abdominal pain and impaction from [facility]. Patient has cirrhosis of liver. Patient was previously Covid +, reports that she was admitted to [facility] from [place]. As per paper chart, patient was most recently at [facility] from 5/6 until admission to hospital.

## 2020-06-01 NOTE — CONSULT NOTE ADULT - SUBJECTIVE AND OBJECTIVE BOX
[facility] NEPHROLOGY SERVICES, [facility]  NEPHROLOGY AND HYPERTENSION  [address]  [address]  [place] [address]  [phone]    MD YULISA CASTRO MD ANDREY GONCHARUK, MD MADHU KORRAPATI, MD YELENA ROSENBERG, MD BINNY KOSHY, MD CHRISTOPHER CAPUTO, MD EDWARD BOVER, MD      Information from chart:  "Patient is a 85y old  Female who presents with a chief complaint of Anasarca, Diabetes, Cirrhosis of liver, HTN (2020 15:48)    HPI:  · HPI Objective Statement: 85 F y/o hx of bl aorto femoral bypass, cad, htn, gerd presents with complains of abdominal pain. Pt states she has noticed her abdomen has been distended and has gotten progressively worse with time. She states she has been moving bowel normally. She states she had COVID for an estimation of 5 weeks but at this time does not have it anymore. (30 May 2020 19:18)   "    Patient feels better  Long term smoker  Noted atrophic L kidney    PAST MEDICAL & SURGICAL HISTORY:  H/O cirrhosis  Falls  Cardiac complication: Cad s/p cabg  H/O aorto-femoral bypass: Bilaeral  S/P cholecystectomy    FAMILY HISTORY:    Allergies    penicillin (Unknown)    Intolerances      Home Medications:  Aspirin Enteric Coated 81 mg oral delayed release tablet: 1 tab(s) orally once a day (30 May 2020 19:46)  Basaglar KwikPen 100 units/mL subcutaneous solution: 12  subcutaneous once a day (at bedtime) (30 May 2020 19:45)  Colace 100 mg oral capsule: 1 cap(s) orally 2 times a day (30 May 2020 19:48)  ferrous sulfate 325 mg (65 mg elemental iron) oral tablet:  (30 May 2020 19:50)  Flomax 0.4 mg oral capsule: 1 cap(s) orally once a day (30 May 2020 19:47)  gabapentin 100 mg oral tablet: orally 3 times a day (30 May 2020 19:46)  Lasix 40 mg oral tablet: 1 tab(s) orally once a day (30 May 2020 19:51)  Lipitor 20 mg oral tablet: 1 tab(s) orally once a day (30 May 2020 19:44)  metoprolol: 25 milligram(s) orally 2 times a day (30 May 2020 19:47)  Milk of Magnesia 8% oral suspension: 15 milliliter(s) orally once a day (at bedtime), As Needed (30 May 2020 19:49)  MiraLax oral powder for reconstitution: orally 2 times a day (30 May 2020 19:43)  Mylanta oral suspension:  (30 May 2020 19:50)  Plavix 75 mg oral tablet: 1 tab(s) orally once a day (30 May 2020 19:45)  potassium chloride 10 mEq oral capsule, extended release: 1 cap(s) orally 2 times a day (30 May 2020 19:50)  Protonix 40 mg oral delayed release tablet: 1 tab(s) orally once a day (30 May 2020 19:50)  Senna 8.6 mg oral tablet:  (30 May 2020 19:49)  tolnaftate 1% topical powder: Apply topically to affected area once a day  under breasts (30 May 2020 19:42)  Tylenol 325 mg oral tablet: 2 tab(s) orally every 4 hours (30 May 2020 19:49)  Ventolin 90 mcg/inh inhalation aerosol: inhaled 3 times a day (30 May 2020 19:48)  Vitamin C 500 mg oral tablet: 1 tab(s) orally once a day (30 May 2020 19:47)  Vitamin D3 1000 intl units (25 mcg) oral tablet: orally once a day (30 May 2020 19:45)    MEDICATIONS  (STANDING):  ascorbic acid 500 milliGRAM(s) Oral daily  aspirin enteric coated 81 milliGRAM(s) Oral daily  atorvastatin 20 milliGRAM(s) Oral at bedtime  cholecalciferol 1000 Unit(s) Oral daily  clopidogrel Tablet 75 milliGRAM(s) Oral daily  dextrose 5%. 1000 milliLiter(s) (50 mL/Hr) IV Continuous <Continuous>  dextrose 50% Injectable 12.5 Gram(s) IV Push once  dextrose 50% Injectable 25 Gram(s) IV Push once  dextrose 50% Injectable 25 Gram(s) IV Push once  heparin   Injectable 5000 Unit(s) SubCutaneous every 12 hours  insulin lispro (HumaLOG) corrective regimen sliding scale   SubCutaneous three times a day before meals  insulin lispro (HumaLOG) corrective regimen sliding scale   SubCutaneous at bedtime  metoprolol tartrate 25 milliGRAM(s) Oral two times a day  nystatin Powder 1 Application(s) Topical two times a day  pantoprazole    Tablet 40 milliGRAM(s) Oral before breakfast  senna 2 Tablet(s) Oral at bedtime  simethicone 80 milliGRAM(s) Chew two times a day  spironolactone 50 milliGRAM(s) Oral daily  tamsulosin 0.4 milliGRAM(s) Oral at bedtime    MEDICATIONS  (PRN):  ALBUTerol    90 MICROgram(s) HFA Inhaler 2 Puff(s) Inhalation every 6 hours PRN Shortness of Breath and/or Wheezing  dextrose 40% Gel 15 Gram(s) Oral once PRN Blood Glucose LESS THAN 70 milliGRAM(s)/deciliter  glucagon  Injectable 1 milliGRAM(s) IntraMuscular once PRN Glucose LESS THAN 70 milligrams/deciliter  polyethylene glycol 3350 17 Gram(s) Oral two times a day PRN Constipation    Vital Signs Last 24 Hrs  T(C): 36.7 (2020 17:10), Max: 36.7 (2020 11:14)  T(F): 98.1 (2020 17:10), Max: 98.1 (2020 17:10)  HR: 65 (2020 17:10) (63 - 65)  BP: 122/78 (2020 17:10) (116/50 - 122/78)  BP(mean): --  RR: 18 (2020 17:10) (17 - 18)  SpO2: 97% (2020 17:10) (96% - 98%)    Daily Height in cm: 160.02 (2020 13:51)    Daily Weight in k.3 (2020 14:00)    20 @ 07:01  -  - @ 07:00  --------------------------------------------------------  IN: 0 mL / OUT: 1 mL / NET: -1 mL      CAPILLARY BLOOD GLUCOSE      POCT Blood Glucose.: 153 mg/dL (2020 16:13)  POCT Blood Glucose.: 135 mg/dL (2020 11:04)  POCT Blood Glucose.: 124 mg/dL (2020 07:22)  POCT Blood Glucose.: 153 mg/dL (31 May 2020 22:02)    PHYSICAL EXAM:      T(C): 36.7 (20 @ 17:10), Max: 36.7 (20 @ 11:14)  HR: 65 (20 @ 17:10) (63 - 65)  BP: 122/78 (20 @ 17:10) (116/50 - 122/78)  RR: 18 (20 @ 17:10) (17 - 18)  SpO2: 97% (20 @ 17:10) (96% - 98%)  Wt(kg): --  Lungs clear  Heart S1S2  Abd soft NT ND  Extremities:   tr edema                  140  |  105  |  46<H>  ----------------------------<  107<H>  4.5   |  27  |  1.76<H>    Ca    9.6      2020 08:17                            9.4    3.31  )-----------( 111      ( 2020 08:17 )             30.8     Creatinine Trend: 1.76<--, 1.64<--, 1.89<--          Assessment   CKD 3-4; 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

## 2020-06-01 NOTE — DIETITIAN INITIAL EVALUATION ADULT. - PERTINENT LABORATORY DATA
06-01 Na140 mmol/L Glu 107 mg/dL<H> K+ 4.5 mmol/L Cr  1.76 mg/dL<H> BUN 46 mg/dL<H> 05-30 Alb 3.2 g/dL<L>05-30 ALT 34 U/L AST 32 U/L Alkaline Phosphatase 361 U/L<H>, HgbA1c = 6.4 %

## 2020-06-01 NOTE — DIETITIAN INITIAL EVALUATION ADULT. - OTHER INFO
Pt w/ c/o abd pain, negative wt loss. Reports her wt to be approx 145 #. Pt appears to be a poor historian, She reports positive COVID x 5 weeks PTA. Pt w/ Cirrhosis and anasarca. She lives alone, orders her food from a  company. Denies food allergies. Pt's BMI is not < 19.     In accordance w/ current standards limiting patient contact - unable to perform physical assessment at this time.    Physical Appearance - WDWN - 2+/3+ Edema

## 2020-06-01 NOTE — PROGRESS NOTE ADULT - ASSESSMENT
HPI:  · HPI Objective Statement: 85 F y/o hx of bl aorto femoral bypass, cad, htn, gerd presents with complains of abdominal pain. Pt states she has noticed her abdomen has been distended and has gotten progressively worse with time. She states she has been moving bowel normally. She states she had COVID for an estimation of 5 weeks but at this time does not have it anymore. (30 May 2020 19:18)  --------------------- As Above ----------------------------------  Patient is a poor historian . Patient presents with upper abdominal pain best described as bloating. She feels like someone is trying to push out from inside the stomach. Started (?). History of diarrhea alternating with constipation. Patient denies having any BMs while in the hospital  Patient had a CT Scan on October 10/15/19 c/w cirrhosis On admission, patient had a CT Scan which revealed cirrhosis with anasarca. Mild amount of ascites is present.   Denies having alicia prior liver disease history. Denies ETOH abuse.  Patient denies having a colonoscopy  See labs / CT scan    Abdominal pain - Distention (+) , KUB negative. r/o ileus, food intolerance , obstruction, PUD  1)  EGD - patient refusing 2) simethicone 3)  D/C ASA  Cirrhosis - probably secondary to NAFLD VS congestive liver  Viral studies negative  BETSY, ASMA, AMA pending 1) Additional blood work pending 2) old chart

## 2020-06-02 ENCOUNTER — TRANSCRIPTION ENCOUNTER (OUTPATIENT)
Age: 85
End: 2020-06-02

## 2020-06-02 LAB
% ALBUMIN: 53.8 % — SIGNIFICANT CHANGE UP
% ALPHA 1: 5.1 % — SIGNIFICANT CHANGE UP
% ALPHA 2: 10.2 % — SIGNIFICANT CHANGE UP
% BETA: 14.2 % — SIGNIFICANT CHANGE UP
% GAMMA: 16.7 % — SIGNIFICANT CHANGE UP
% M SPIKE: SIGNIFICANT CHANGE UP
-  AMPICILLIN: SIGNIFICANT CHANGE UP
-  CIPROFLOXACIN: SIGNIFICANT CHANGE UP
-  LEVOFLOXACIN: SIGNIFICANT CHANGE UP
-  NITROFURANTOIN: SIGNIFICANT CHANGE UP
-  TETRACYCLINE: SIGNIFICANT CHANGE UP
-  VANCOMYCIN: SIGNIFICANT CHANGE UP
ALBUMIN SERPL ELPH-MCNC: 3.7 G/DL — SIGNIFICANT CHANGE UP (ref 3.6–5.5)
ALBUMIN/GLOB SERPL ELPH: 1.2 RATIO — SIGNIFICANT CHANGE UP
ALPHA1 GLOB SERPL ELPH-MCNC: 0.4 G/DL — SIGNIFICANT CHANGE UP (ref 0.1–0.4)
ALPHA2 GLOB SERPL ELPH-MCNC: 0.7 G/DL — SIGNIFICANT CHANGE UP (ref 0.5–1)
ANION GAP SERPL CALC-SCNC: 4 MMOL/L — LOW (ref 5–17)
B-GLOBULIN SERPL ELPH-MCNC: 1 G/DL — SIGNIFICANT CHANGE UP (ref 0.5–1)
BUN SERPL-MCNC: 47 MG/DL — HIGH (ref 7–23)
CALCIUM SERPL-MCNC: 9.6 MG/DL — SIGNIFICANT CHANGE UP (ref 8.5–10.1)
CHLORIDE SERPL-SCNC: 105 MMOL/L — SIGNIFICANT CHANGE UP (ref 96–108)
CO2 SERPL-SCNC: 30 MMOL/L — SIGNIFICANT CHANGE UP (ref 22–31)
CREAT SERPL-MCNC: 1.6 MG/DL — HIGH (ref 0.5–1.3)
GAMMA GLOBULIN: 1.2 G/DL — SIGNIFICANT CHANGE UP (ref 0.6–1.6)
GLUCOSE BLDC GLUCOMTR-MCNC: 130 MG/DL — HIGH (ref 70–99)
GLUCOSE BLDC GLUCOMTR-MCNC: 135 MG/DL — HIGH (ref 70–99)
GLUCOSE BLDC GLUCOMTR-MCNC: 137 MG/DL — HIGH (ref 70–99)
GLUCOSE BLDC GLUCOMTR-MCNC: 142 MG/DL — HIGH (ref 70–99)
GLUCOSE BLDC GLUCOMTR-MCNC: 260 MG/DL — HIGH (ref 70–99)
GLUCOSE SERPL-MCNC: 120 MG/DL — HIGH (ref 70–99)
HCT VFR BLD CALC: 32.8 % — LOW (ref 34.5–45)
HGB BLD-MCNC: 9.8 G/DL — LOW (ref 11.5–15.5)
IGA FLD-MCNC: 589 MG/DL — HIGH (ref 84–499)
IGG FLD-MCNC: 1075 MG/DL — SIGNIFICANT CHANGE UP (ref 610–1660)
IGM SERPL-MCNC: 66 MG/DL — SIGNIFICANT CHANGE UP (ref 35–242)
INTERPRETATION SERPL IFE-IMP: SIGNIFICANT CHANGE UP
KAPPA LC SER QL IFE: 8.41 MG/DL — HIGH (ref 0.33–1.94)
KAPPA/LAMBDA FREE LIGHT CHAIN RATIO, SERUM: 1.24 RATIO — SIGNIFICANT CHANGE UP (ref 0.26–1.65)
LAMBDA LC SER QL IFE: 6.76 MG/DL — HIGH (ref 0.57–2.63)
M-SPIKE: SIGNIFICANT CHANGE UP (ref 0–0)
MCHC RBC-ENTMCNC: 29 PG — SIGNIFICANT CHANGE UP (ref 27–34)
MCHC RBC-ENTMCNC: 29.9 GM/DL — LOW (ref 32–36)
MCV RBC AUTO: 97 FL — SIGNIFICANT CHANGE UP (ref 80–100)
METHOD TYPE: SIGNIFICANT CHANGE UP
NRBC # BLD: 0 /100 WBCS — SIGNIFICANT CHANGE UP (ref 0–0)
PLATELET # BLD AUTO: 116 K/UL — LOW (ref 150–400)
POTASSIUM SERPL-MCNC: 4.1 MMOL/L — SIGNIFICANT CHANGE UP (ref 3.5–5.3)
POTASSIUM SERPL-SCNC: 4.1 MMOL/L — SIGNIFICANT CHANGE UP (ref 3.5–5.3)
PROT ?TM UR-MCNC: 6 MG/DL — SIGNIFICANT CHANGE UP (ref 0–12)
PROT PATTERN SERPL ELPH-IMP: SIGNIFICANT CHANGE UP
PROT SERPL-MCNC: 6.9 G/DL — SIGNIFICANT CHANGE UP (ref 6–8.3)
PROT SERPL-MCNC: 6.9 G/DL — SIGNIFICANT CHANGE UP (ref 6–8.3)
RBC # BLD: 3.38 M/UL — LOW (ref 3.8–5.2)
RBC # FLD: 16.7 % — HIGH (ref 10.3–14.5)
SODIUM SERPL-SCNC: 139 MMOL/L — SIGNIFICANT CHANGE UP (ref 135–145)
WBC # BLD: 4.1 K/UL — SIGNIFICANT CHANGE UP (ref 3.8–10.5)
WBC # FLD AUTO: 4.1 K/UL — SIGNIFICANT CHANGE UP (ref 3.8–10.5)

## 2020-06-02 RX ORDER — CHOLECALCIFEROL (VITAMIN D3) 125 MCG
0 CAPSULE ORAL
Qty: 0 | Refills: 0 | DISCHARGE

## 2020-06-02 RX ORDER — FERROUS SULFATE 325(65) MG
0 TABLET ORAL
Qty: 0 | Refills: 0 | DISCHARGE

## 2020-06-02 RX ORDER — CLOPIDOGREL BISULFATE 75 MG/1
1 TABLET, FILM COATED ORAL
Qty: 30 | Refills: 0
Start: 2020-06-02 | End: 2020-07-01

## 2020-06-02 RX ORDER — ASPIRIN/CALCIUM CARB/MAGNESIUM 324 MG
1 TABLET ORAL
Qty: 0 | Refills: 0 | DISCHARGE

## 2020-06-02 RX ORDER — METOPROLOL TARTRATE 50 MG
25 TABLET ORAL
Qty: 0 | Refills: 0 | DISCHARGE

## 2020-06-02 RX ORDER — MAGNESIUM HYDROXIDE 400 MG/1
15 TABLET, CHEWABLE ORAL
Qty: 450 | Refills: 0
Start: 2020-06-02 | End: 2020-07-01

## 2020-06-02 RX ORDER — SPIRONOLACTONE 25 MG/1
2 TABLET, FILM COATED ORAL
Qty: 60 | Refills: 0
Start: 2020-06-02 | End: 2020-07-01

## 2020-06-02 RX ORDER — ALBUTEROL 90 UG/1
0 AEROSOL, METERED ORAL
Qty: 0 | Refills: 0 | DISCHARGE

## 2020-06-02 RX ORDER — METOPROLOL TARTRATE 50 MG
1 TABLET ORAL
Qty: 60 | Refills: 0
Start: 2020-06-02 | End: 2020-07-01

## 2020-06-02 RX ORDER — MAGNESIUM HYDROXIDE 400 MG/1
15 TABLET, CHEWABLE ORAL
Qty: 0 | Refills: 0 | DISCHARGE

## 2020-06-02 RX ORDER — TAMSULOSIN HYDROCHLORIDE 0.4 MG/1
1 CAPSULE ORAL
Qty: 0 | Refills: 0 | DISCHARGE

## 2020-06-02 RX ORDER — GABAPENTIN 400 MG/1
0 CAPSULE ORAL
Qty: 0 | Refills: 0 | DISCHARGE

## 2020-06-02 RX ORDER — INSULIN GLARGINE 100 [IU]/ML
12 INJECTION, SOLUTION SUBCUTANEOUS
Qty: 15 | Refills: 0
Start: 2020-06-02 | End: 2020-07-01

## 2020-06-02 RX ORDER — POLYETHYLENE GLYCOL 3350 17 G/17G
1 POWDER, FOR SOLUTION ORAL
Qty: 3350 | Refills: 0
Start: 2020-06-02 | End: 2020-07-01

## 2020-06-02 RX ORDER — ATORVASTATIN CALCIUM 80 MG/1
1 TABLET, FILM COATED ORAL
Qty: 0 | Refills: 0 | DISCHARGE

## 2020-06-02 RX ORDER — POTASSIUM CHLORIDE 20 MEQ
1 PACKET (EA) ORAL
Qty: 0 | Refills: 0 | DISCHARGE

## 2020-06-02 RX ORDER — CHOLECALCIFEROL (VITAMIN D3) 125 MCG
1 CAPSULE ORAL
Qty: 30 | Refills: 0
Start: 2020-06-02 | End: 2020-07-01

## 2020-06-02 RX ORDER — FERROUS SULFATE 325(65) MG
1 TABLET ORAL
Qty: 60 | Refills: 0
Start: 2020-06-02 | End: 2020-07-01

## 2020-06-02 RX ORDER — PANTOPRAZOLE SODIUM 20 MG/1
1 TABLET, DELAYED RELEASE ORAL
Qty: 30 | Refills: 0
Start: 2020-06-02 | End: 2020-07-01

## 2020-06-02 RX ORDER — ALBUTEROL 90 UG/1
1 AEROSOL, METERED ORAL
Qty: 1 | Refills: 0
Start: 2020-06-02 | End: 2020-07-01

## 2020-06-02 RX ORDER — ASCORBIC ACID 60 MG
1 TABLET,CHEWABLE ORAL
Qty: 0 | Refills: 0 | DISCHARGE

## 2020-06-02 RX ORDER — PANTOPRAZOLE SODIUM 20 MG/1
1 TABLET, DELAYED RELEASE ORAL
Qty: 0 | Refills: 0 | DISCHARGE

## 2020-06-02 RX ORDER — CLOPIDOGREL BISULFATE 75 MG/1
1 TABLET, FILM COATED ORAL
Qty: 0 | Refills: 0 | DISCHARGE

## 2020-06-02 RX ORDER — ASPIRIN/CALCIUM CARB/MAGNESIUM 324 MG
1 TABLET ORAL
Qty: 30 | Refills: 0
Start: 2020-06-02 | End: 2020-07-01

## 2020-06-02 RX ORDER — ASCORBIC ACID 60 MG
1 TABLET,CHEWABLE ORAL
Qty: 30 | Refills: 0
Start: 2020-06-02 | End: 2020-07-01

## 2020-06-02 RX ORDER — DOCUSATE SODIUM 100 MG
1 CAPSULE ORAL
Qty: 60 | Refills: 0
Start: 2020-06-02 | End: 2020-07-01

## 2020-06-02 RX ORDER — GABAPENTIN 400 MG/1
1 CAPSULE ORAL
Qty: 90 | Refills: 0
Start: 2020-06-02 | End: 2020-07-01

## 2020-06-02 RX ORDER — SPIRONOLACTONE 25 MG/1
2 TABLET, FILM COATED ORAL
Qty: 0 | Refills: 0 | DISCHARGE
Start: 2020-06-02

## 2020-06-02 RX ORDER — FUROSEMIDE 40 MG
1 TABLET ORAL
Qty: 0 | Refills: 0 | DISCHARGE

## 2020-06-02 RX ORDER — INSULIN GLARGINE 100 [IU]/ML
12 INJECTION, SOLUTION SUBCUTANEOUS
Qty: 0 | Refills: 0 | DISCHARGE

## 2020-06-02 RX ORDER — DOCUSATE SODIUM 100 MG
1 CAPSULE ORAL
Qty: 0 | Refills: 0 | DISCHARGE

## 2020-06-02 RX ORDER — TAMSULOSIN HYDROCHLORIDE 0.4 MG/1
1 CAPSULE ORAL
Qty: 30 | Refills: 0
Start: 2020-06-02 | End: 2020-07-01

## 2020-06-02 RX ORDER — POLYETHYLENE GLYCOL 3350 17 G/17G
0 POWDER, FOR SOLUTION ORAL
Qty: 0 | Refills: 0 | DISCHARGE

## 2020-06-02 RX ORDER — ATORVASTATIN CALCIUM 80 MG/1
1 TABLET, FILM COATED ORAL
Qty: 30 | Refills: 0
Start: 2020-06-02 | End: 2020-07-01

## 2020-06-02 RX ADMIN — Medication 25 MILLIGRAM(S): at 17:58

## 2020-06-02 RX ADMIN — Medication 1000 UNIT(S): at 11:40

## 2020-06-02 RX ADMIN — NYSTATIN CREAM 1 APPLICATION(S): 100000 CREAM TOPICAL at 06:54

## 2020-06-02 RX ADMIN — SIMETHICONE 80 MILLIGRAM(S): 80 TABLET, CHEWABLE ORAL at 06:53

## 2020-06-02 RX ADMIN — ATORVASTATIN CALCIUM 20 MILLIGRAM(S): 80 TABLET, FILM COATED ORAL at 22:13

## 2020-06-02 RX ADMIN — Medication 25 MILLIGRAM(S): at 06:53

## 2020-06-02 RX ADMIN — HEPARIN SODIUM 5000 UNIT(S): 5000 INJECTION INTRAVENOUS; SUBCUTANEOUS at 17:58

## 2020-06-02 RX ADMIN — PANTOPRAZOLE SODIUM 40 MILLIGRAM(S): 20 TABLET, DELAYED RELEASE ORAL at 07:40

## 2020-06-02 RX ADMIN — CLOPIDOGREL BISULFATE 75 MILLIGRAM(S): 75 TABLET, FILM COATED ORAL at 11:40

## 2020-06-02 RX ADMIN — Medication 500 MILLIGRAM(S): at 11:41

## 2020-06-02 RX ADMIN — SPIRONOLACTONE 50 MILLIGRAM(S): 25 TABLET, FILM COATED ORAL at 06:53

## 2020-06-02 RX ADMIN — SIMETHICONE 80 MILLIGRAM(S): 80 TABLET, CHEWABLE ORAL at 17:58

## 2020-06-02 RX ADMIN — NYSTATIN CREAM 1 APPLICATION(S): 100000 CREAM TOPICAL at 17:58

## 2020-06-02 RX ADMIN — HEPARIN SODIUM 5000 UNIT(S): 5000 INJECTION INTRAVENOUS; SUBCUTANEOUS at 06:53

## 2020-06-02 RX ADMIN — Medication 6: at 11:39

## 2020-06-02 RX ADMIN — TAMSULOSIN HYDROCHLORIDE 0.4 MILLIGRAM(S): 0.4 CAPSULE ORAL at 22:13

## 2020-06-02 NOTE — PHYSICAL THERAPY INITIAL EVALUATION ADULT - DISCHARGE DISPOSITION, PT EVAL
home w/ home PT
rehabilitation facility/recommendation for short term rehab secondary to change in functional status

## 2020-06-02 NOTE — PHYSICAL THERAPY INITIAL EVALUATION ADULT - FOLLOWS COMMANDS/ANSWERS QUESTIONS, REHAB EVAL
100% of the time/able to follow single-step instructions
able to follow multistep instructions/100% of the time/verbal cueing with carryover

## 2020-06-02 NOTE — PHYSICAL THERAPY INITIAL EVALUATION ADULT - BALANCE TRAINING, PT EVAL
Patient will be normal in static and dynamic standing balance with rolling walker in 1-2 weeks to improve safety and decrease risk of falls.
pt will increase sitting and standing static and dynamic balance by full grade for safety with ambulation, ADLs and transfers x8 weeks

## 2020-06-02 NOTE — OCCUPATIONAL THERAPY INITIAL EVALUATION ADULT - RANGE OF MOTION EXAMINATION, UPPER EXTREMITY
bilateral UE Passive ROM was WFL  (within functional limits)/bilateral UE Active ROM was WFL  (within functional limits)/Grossly.

## 2020-06-02 NOTE — OCCUPATIONAL THERAPY INITIAL EVALUATION ADULT - RANGE OF MOTION EXAMINATION, LOWER EXTREMITY
bilateral LE Active ROM was WFL  (within functional limits)/bilateral LE Passive ROM was WFL  (within functional limits)/Grossly.

## 2020-06-02 NOTE — OCCUPATIONAL THERAPY INITIAL EVALUATION ADULT - ADDITIONAL COMMENTS
Patient lives alone in an apartment with no steps to enter. Once inside, the patient main bedroom and bathroom is on that level when entering. The patients bathroom has a tub/shower combination with a retractable shower head, regular toilet with grab bars. The patient reports having a 3/1 commode at home. The patient ambulates with a rollator and does not own any other device for ambulation.

## 2020-06-02 NOTE — OCCUPATIONAL THERAPY INITIAL EVALUATION ADULT - GENERAL OBSERVATIONS, REHAB EVAL
Chart reviewed. Events to date noted. Pt was encountered OOB in chair; NAD, AXOX4, cooperative, followed commands.

## 2020-06-02 NOTE — OCCUPATIONAL THERAPY INITIAL EVALUATION ADULT - BED MOBILITY/TRANSFERS, PREVIOUS LEVEL OF FUNCTION, OT EVAL
Urine is negative for infection  Very small amount of blood this can be due to period   
independent

## 2020-06-02 NOTE — PHYSICAL THERAPY INITIAL EVALUATION ADULT - GAIT DEVIATIONS NOTED, PT EVAL
decreased stride length/decreased step length/decreased keith/decreased weight-shifting ability
Poor keith/decreased weight-shifting ability/decreased keith

## 2020-06-02 NOTE — OCCUPATIONAL THERAPY INITIAL EVALUATION ADULT - TRANSFER SAFETY CONCERNS NOTED: SIT/STAND, REHAB EVAL
decreased safety awareness/losing balance/decreased weight-shifting ability/decreased sequencing ability/decreased step length/decreased balance during turns

## 2020-06-02 NOTE — PROGRESS NOTE ADULT - SUBJECTIVE AND OBJECTIVE BOX
[facility] NEPHROLOGY SERVICES, [facility]  NEPHROLOGY AND HYPERTENSION  [address]  [address]  [place] [address]  [phone]    MD YULISA CASTRO MD ANDREY GONCHARUK, MD MADHU KORRAPATI, MD YELENA ROSENBERG, MD BINNY KOSHY, MD CHRISTOPHER CAPUTO, MD EDWARD BOVER, MD          Patient events noted    MEDICATIONS  (STANDING):  ascorbic acid 500 milliGRAM(s) Oral daily  aspirin enteric coated 81 milliGRAM(s) Oral daily  atorvastatin 20 milliGRAM(s) Oral at bedtime  cholecalciferol 1000 Unit(s) Oral daily  clopidogrel Tablet 75 milliGRAM(s) Oral daily  dextrose 5%. 1000 milliLiter(s) (50 mL/Hr) IV Continuous <Continuous>  dextrose 50% Injectable 12.5 Gram(s) IV Push once  dextrose 50% Injectable 25 Gram(s) IV Push once  dextrose 50% Injectable 25 Gram(s) IV Push once  heparin   Injectable 5000 Unit(s) SubCutaneous every 12 hours  insulin lispro (HumaLOG) corrective regimen sliding scale   SubCutaneous three times a day before meals  insulin lispro (HumaLOG) corrective regimen sliding scale   SubCutaneous at bedtime  metoprolol tartrate 25 milliGRAM(s) Oral two times a day  nystatin Powder 1 Application(s) Topical two times a day  pantoprazole    Tablet 40 milliGRAM(s) Oral before breakfast  senna 2 Tablet(s) Oral at bedtime  simethicone 80 milliGRAM(s) Chew two times a day  spironolactone 50 milliGRAM(s) Oral daily  tamsulosin 0.4 milliGRAM(s) Oral at bedtime    MEDICATIONS  (PRN):  ALBUTerol    90 MICROgram(s) HFA Inhaler 2 Puff(s) Inhalation every 6 hours PRN Shortness of Breath and/or Wheezing  dextrose 40% Gel 15 Gram(s) Oral once PRN Blood Glucose LESS THAN 70 milliGRAM(s)/deciliter  glucagon  Injectable 1 milliGRAM(s) IntraMuscular once PRN Glucose LESS THAN 70 milligrams/deciliter  polyethylene glycol 3350 17 Gram(s) Oral two times a day PRN Constipation      06-02-20 @ 07:01  -  06-02-20 @ 20:20  --------------------------------------------------------  IN: 600 mL / OUT: 2 mL / NET: 598 mL      PHYSICAL EXAM:      T(C): 36.7 (06-02-20 @ 17:26), Max: 36.7 (06-02-20 @ 17:26)  HR: 77 (06-02-20 @ 17:26) (70 - 77)  BP: 143/62 (06-02-20 @ 17:26) (141/70 - 147/60)  RR: 18 (06-02-20 @ 17:26) (18 - 18)  SpO2: 100% (06-02-20 @ 17:26) (97% - 100%)  Wt(kg): --  Lungs clear  Heart S1S2  Abd soft NT ND  Extremities:   tr edema                                    9.8    4.10  )-----------( 116      ( 02 Jun 2020 08:26 )             32.8     06-02    139  |  105  |  47<H>  ----------------------------<  120<H>  4.1   |  30  |  1.60<H>    Ca    9.6      02 Jun 2020 08:26    TPro  6.9  /  Alb  3.7  /  TBili  x   /  DBili  x   /  AST  x   /  ALT  x   /  AlkPhos  x   06-02      LIVER FUNCTIONS - ( 02 Jun 2020 10:54 )  Alb: 3.7 g/dL / Pro: 6.9 g/dL / ALK PHOS: x     / ALT: x     / AST: x     / GGT: x           Creatinine Trend: 1.60<--, 1.76<--, 1.64<--, 1.89<--      Assessment   CKD 3-4; 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

## 2020-06-02 NOTE — DISCHARGE NOTE PROVIDER - CARE PROVIDER_API CALL
PMD,   KWABENA Galvan in 1 week  Phone: (   )    -  Fax: (   )    -  Follow Up Time: 1 week PMD,   Dr Ignacio Vargas PMD in 1 week  Phone: (   )    -  Fax: (   )    -  Follow Up Time: 1 week    [place] [address]  Phone: [phone]  Fax: [phone]  Follow Up Time:

## 2020-06-02 NOTE — PROGRESS NOTE ADULT - SUBJECTIVE AND OBJECTIVE BOX
Patient is a 85y old  Female who presents with a chief complaint of Anasarca, Diabetes, Cirrhosis of liver, HTN (02 Jun 2020 09:57)      HPI:  · HPI Objective Statement: 85 F y/o hx of bl aorto femoral bypass, cad, htn, gerd presents with complains of abdominal pain. Pt states she has noticed her abdomen has been distended and has gotten progressively worse with time. She states she has been moving bowel normally. She states she had COVID for an estimation of 5 weeks but at this time does not have it anymore. (30 May 2020 19:18)      INTERVAL HPI/OVERNIGHT EVENTS:  Called because patient had a bloody BM. Aid reports that the patient had a soft, formed brown BM with a small amount of blood ON it and a small blood clot. Afterwords, the patient had another BM that was brown, formed and without any blood on it.     MEDICATIONS  (STANDING):  ascorbic acid 500 milliGRAM(s) Oral daily  aspirin enteric coated 81 milliGRAM(s) Oral daily  atorvastatin 20 milliGRAM(s) Oral at bedtime  cholecalciferol 1000 Unit(s) Oral daily  clopidogrel Tablet 75 milliGRAM(s) Oral daily  dextrose 5%. 1000 milliLiter(s) (50 mL/Hr) IV Continuous <Continuous>  dextrose 50% Injectable 12.5 Gram(s) IV Push once  dextrose 50% Injectable 25 Gram(s) IV Push once  dextrose 50% Injectable 25 Gram(s) IV Push once  heparin   Injectable 5000 Unit(s) SubCutaneous every 12 hours  insulin lispro (HumaLOG) corrective regimen sliding scale   SubCutaneous three times a day before meals  insulin lispro (HumaLOG) corrective regimen sliding scale   SubCutaneous at bedtime  metoprolol tartrate 25 milliGRAM(s) Oral two times a day  nystatin Powder 1 Application(s) Topical two times a day  pantoprazole    Tablet 40 milliGRAM(s) Oral before breakfast  senna 2 Tablet(s) Oral at bedtime  simethicone 80 milliGRAM(s) Chew two times a day  spironolactone 50 milliGRAM(s) Oral daily  tamsulosin 0.4 milliGRAM(s) Oral at bedtime    MEDICATIONS  (PRN):  ALBUTerol    90 MICROgram(s) HFA Inhaler 2 Puff(s) Inhalation every 6 hours PRN Shortness of Breath and/or Wheezing  dextrose 40% Gel 15 Gram(s) Oral once PRN Blood Glucose LESS THAN 70 milliGRAM(s)/deciliter  glucagon  Injectable 1 milliGRAM(s) IntraMuscular once PRN Glucose LESS THAN 70 milligrams/deciliter  polyethylene glycol 3350 17 Gram(s) Oral two times a day PRN Constipation      FAMILY HISTORY:      Allergies    penicillin (Unknown)    Intolerances        PMH/PSH:  H/O cirrhosis  Falls  Cardiac complication  H/O aorto-femoral bypass  S/P cholecystectomy        REVIEW OF SYSTEMS:  CONSTITUTIONAL: No fever, weight loss, or fatigue  EYES: No eye pain, visual disturbances, or discharge  ENMT:  No difficulty hearing, tinnitus, vertigo; No sinus or throat pain  NECK: No pain or stiffness  BREASTS: No pain, masses, or nipple discharge  RESPIRATORY: No cough, wheezing, chills or hemoptysis; No shortness of breath  CARDIOVASCULAR: No chest pain, palpitations, dizziness, or leg swelling  GASTROINTESTINAL: No abdominal or epigastric pain. No nausea, vomiting, or hematemesis; No diarrhea or constipation. No melena or hematochezia.  GENITOURINARY: No dysuria, frequency, hematuria, or incontinence  NEUROLOGICAL: No headaches, memory loss, loss of strength, numbness, or tremors  SKIN: No itching, burning, rashes, or lesions   LYMPH NODES: No enlarged glands  ENDOCRINE: No heat or cold intolerance; No hair loss  MUSCULOSKELETAL: No joint pain or swelling; No muscle, back, or extremity pain  PSYCHIATRIC: No depression, anxiety, mood swings, or difficulty sleeping  HEME/LYMPH: No easy bruising, or bleeding gums  ALLERGY AND IMMUNOLOGIC: No hives or eczema    Vital Signs Last 24 Hrs  T(C): 36.3 (02 Jun 2020 11:05), Max: 36.7 (01 Jun 2020 17:10)  T(F): 97.4 (02 Jun 2020 11:05), Max: 98.1 (01 Jun 2020 17:10)  HR: 70 (02 Jun 2020 12:52) (65 - 72)  BP: 142/63 (02 Jun 2020 12:52) (122/78 - 147/60)  BP(mean): --  RR: 18 (02 Jun 2020 11:05) (18 - 18)  SpO2: 99% (02 Jun 2020 12:52) (97% - 100%)    PHYSICAL EXAM:  GENERAL: NAD, well-groomed, well-developed  HEAD:  Atraumatic, Normocephalic  EYES: EOMI, PERRLA, conjunctiva and sclera clear  ENMT: No tonsillar erythema, exudates, or enlargement; Moist mucous membranes, Good dentition, No lesions  NECK: Supple, No JVD, Normal thyroid  NERVOUS SYSTEM:  Alert & Oriented X3, Good concentration; Motor Strength 5/5 B/L upper and lower extremities; DTRs 2+ intact and symmetric  CHEST/LUNG: Clear to percussion bilaterally; No rales, rhonchi, wheezing, or rubs  HEART: Regular rate and rhythm; No murmurs, rubs, or gallops  ABDOMEN: Soft, Nontender, Nondistended; Bowel sounds present  EXTREMITIES:  2+ Peripheral Pulses, No clubbing, cyanosis, or edema  LYMPH: No lymphadenopathy noted  SKIN: No rashes or lesions    LAB                          9.8    4.10  )-----------( 116      ( 02 Jun 2020 08:26 )             32.8       CBC:  06-02 @ 08:26  WBC 4.10   Hgb 9.8   Hct 32.8   Plts 116  MCV 97.0  06-01 @ 08:17  WBC 3.31   Hgb 9.4   Hct 30.8   Plts 111  MCV 95.7  05-31 @ 07:14  WBC 3.42   Hgb 9.1   Hct 30.7   Plts 115  MCV 98.1  05-30 @ 16:58  WBC 3.60   Hgb 9.4   Hct 30.8   Plts 117  MCV 95.1      Chemistry:  06-02 @ 08:26  Na+ 139  K+ 4.1  Cl- 105  CO2 30  BUN 47  Cr 1.60     06-01 @ 08:17  Na+ 140  K+ 4.5  Cl- 105  CO2 27  BUN 46  Cr 1.76     05-31 @ 07:14  Na+ 138  K+ 4.1  Cl- 104  CO2 26  BUN 46  Cr 1.64     05-30 @ 16:58  Na+ 138  K+ 4.4  Cl- 103  CO2 26  BUN 47  Cr 1.89         Glucose, Serum: 120 mg/dL (06-02 @ 08:26)  Glucose, Serum: 107 mg/dL (06-01 @ 08:17)  Glucose, Serum: 95 mg/dL (05-31 @ 07:14)  Glucose, Serum: 127 mg/dL (05-30 @ 16:58)      02 Jun 2020 08:26    139    |  105    |  47     ----------------------------<  120    4.1     |  30     |  1.60   01 Jun 2020 08:17    140    |  105    |  46     ----------------------------<  107    4.5     |  27     |  1.76   31 May 2020 07:14    138    |  104    |  46     ----------------------------<  95     4.1     |  26     |  1.64   30 May 2020 16:58    138    |  103    |  47     ----------------------------<  127    4.4     |  26     |  1.89     Ca    9.6        02 Jun 2020 08:26  Ca    9.6        01 Jun 2020 08:17  Ca    9.6        31 May 2020 07:14  Ca    9.5        30 May 2020 16:58  Mg     2.6       30 May 2020 16:58    TPro  6.9    /  Alb  x      /  TBili  x      /  DBili  x      /  AST  x      /  ALT  x      /  AlkPhos  x      02 Jun 2020 10:54  TPro  7.1    /  Alb  3.2    /  TBili  1.3    /  DBili  x      /  AST  32     /  ALT  34     /  AlkPhos  361    30 May 2020 16:58              CAPILLARY BLOOD GLUCOSE      POCT Blood Glucose.: 260 mg/dL (02 Jun 2020 10:44)  POCT Blood Glucose.: 137 mg/dL (02 Jun 2020 07:15)  POCT Blood Glucose.: 146 mg/dL (01 Jun 2020 22:27)  POCT Blood Glucose.: 153 mg/dL (01 Jun 2020 16:13)          RADIOLOGY & ADDITIONAL TESTS:    Imaging Personally Reviewed:  [ ] YES  [ ] NO    Consultant(s) Notes Reviewed:  [ ] YES  [ ] NO    Care Discussed with Consultants/Other Providers [ ] YES  [ ] NO Patient is a 85y old  Female who presents with a chief complaint of Anasarca, Diabetes, Cirrhosis of liver, HTN (02 Jun 2020 09:57)      HPI:  · HPI Objective Statement: 85 F y/o hx of bl aorto femoral bypass, cad, htn, gerd presents with complains of abdominal pain. Pt states she has noticed her abdomen has been distended and has gotten progressively worse with time. She states she has been moving bowel normally. She states she had COVID for an estimation of 5 weeks but at this time does not have it anymore. (30 May 2020 19:18)      INTERVAL HPI/OVERNIGHT EVENTS:  Called because patient had a bloody BM. Aid reports that the patient had a soft, formed brown BM with a small amount of blood ON it and a small blood clot. Afterwords, the patient had another BM that was brown, formed and without any blood on it. The patient denies melena,  hematemesis, nausea, vomiting, abdominal pain, constipation, diarrhea, or change in bowel movements     MEDICATIONS  (STANDING):  ascorbic acid 500 milliGRAM(s) Oral daily  aspirin enteric coated 81 milliGRAM(s) Oral daily  atorvastatin 20 milliGRAM(s) Oral at bedtime  cholecalciferol 1000 Unit(s) Oral daily  clopidogrel Tablet 75 milliGRAM(s) Oral daily  dextrose 5%. 1000 milliLiter(s) (50 mL/Hr) IV Continuous <Continuous>  dextrose 50% Injectable 12.5 Gram(s) IV Push once  dextrose 50% Injectable 25 Gram(s) IV Push once  dextrose 50% Injectable 25 Gram(s) IV Push once  heparin   Injectable 5000 Unit(s) SubCutaneous every 12 hours  insulin lispro (HumaLOG) corrective regimen sliding scale   SubCutaneous three times a day before meals  insulin lispro (HumaLOG) corrective regimen sliding scale   SubCutaneous at bedtime  metoprolol tartrate 25 milliGRAM(s) Oral two times a day  nystatin Powder 1 Application(s) Topical two times a day  pantoprazole    Tablet 40 milliGRAM(s) Oral before breakfast  senna 2 Tablet(s) Oral at bedtime  simethicone 80 milliGRAM(s) Chew two times a day  spironolactone 50 milliGRAM(s) Oral daily  tamsulosin 0.4 milliGRAM(s) Oral at bedtime    MEDICATIONS  (PRN):  ALBUTerol    90 MICROgram(s) HFA Inhaler 2 Puff(s) Inhalation every 6 hours PRN Shortness of Breath and/or Wheezing  dextrose 40% Gel 15 Gram(s) Oral once PRN Blood Glucose LESS THAN 70 milliGRAM(s)/deciliter  glucagon  Injectable 1 milliGRAM(s) IntraMuscular once PRN Glucose LESS THAN 70 milligrams/deciliter  polyethylene glycol 3350 17 Gram(s) Oral two times a day PRN Constipation      FAMILY HISTORY:      Allergies    penicillin (Unknown)    Intolerances        PMH/PSH:  H/O cirrhosis  Falls  Cardiac complication  H/O aorto-femoral bypass  S/P cholecystectomy        REVIEW OF SYSTEMS:  CONSTITUTIONAL: No fever, weight loss, or fatigue  EYES: No eye pain, visual disturbances, or discharge  ENMT:  No difficulty hearing, tinnitus, vertigo; No sinus or throat pain  NECK: No pain or stiffness  BREASTS: No pain, masses, or nipple discharge  RESPIRATORY: No cough, wheezing, chills or hemoptysis; No shortness of breath  CARDIOVASCULAR: No chest pain, palpitations, dizziness, or leg swelling  GASTROINTESTINAL:  see above  GENITOURINARY: No dysuria, frequency, hematuria, or incontinence  NEUROLOGICAL: No headaches, numbness, or tremors  SKIN: No itching, burning, rashes, or lesions   LYMPH NODES: No enlarged glands  ENDOCRINE: No heat or cold intolerance; No hair loss  MUSCULOSKELETAL: No joint pain or swelling; No muscle, back, or extremity pain  PSYCHIATRIC: No depression, anxiety, mood swings, or difficulty sleeping  HEME/LYMPH: No easy bruising, or bleeding gums  ALLERGY AND IMMUNOLOGIC: No hives or eczema    Vital Signs Last 24 Hrs  T(C): 36.3 (02 Jun 2020 11:05), Max: 36.7 (01 Jun 2020 17:10)  T(F): 97.4 (02 Jun 2020 11:05), Max: 98.1 (01 Jun 2020 17:10)  HR: 70 (02 Jun 2020 12:52) (65 - 72)  BP: 142/63 (02 Jun 2020 12:52) (122/78 - 147/60)  BP(mean): --  RR: 18 (02 Jun 2020 11:05) (18 - 18)  SpO2: 99% (02 Jun 2020 12:52) (97% - 100%)    PHYSICAL EXAM:  GENERAL: NAD, well-groomed, well-developed  HEAD:  Atraumatic, Normocephalic  EYES: EOMI, PERRLA, conjunctiva and sclera clear  NECK: Supple, No JVD, Normal thyroid  NERVOUS SYSTEM:  Alert & Oriented X3, Good concentration;   CHEST/LUNG: Clear to percussion bilaterally; No rales, rhonchi, wheezing, or rubs  HEART: Regular rate and rhythm; No murmurs, rubs, or gallops  ABDOMEN: Soft, Nontender, Nondistended; Bowel sounds present  RECTAL : Soft, brown stool  EXTREMITIES:  2+ Peripheral Pulses, No clubbing, cyanosis, or edema  LYMPH: No lymphadenopathy noted  SKIN: No rashes or lesions    LAB                          9.8    4.10  )-----------( 116      ( 02 Jun 2020 08:26 )             32.8       CBC:  06-02 @ 08:26  WBC 4.10   Hgb 9.8   Hct 32.8   Plts 116  MCV 97.0  06-01 @ 08:17  WBC 3.31   Hgb 9.4   Hct 30.8   Plts 111  MCV 95.7  05-31 @ 07:14  WBC 3.42   Hgb 9.1   Hct 30.7   Plts 115  MCV 98.1  05-30 @ 16:58  WBC 3.60   Hgb 9.4   Hct 30.8   Plts 117  MCV 95.1      Chemistry:  06-02 @ 08:26  Na+ 139  K+ 4.1  Cl- 105  CO2 30  BUN 47  Cr 1.60     06-01 @ 08:17  Na+ 140  K+ 4.5  Cl- 105  CO2 27  BUN 46  Cr 1.76     05-31 @ 07:14  Na+ 138  K+ 4.1  Cl- 104  CO2 26  BUN 46  Cr 1.64     05-30 @ 16:58  Na+ 138  K+ 4.4  Cl- 103  CO2 26  BUN 47  Cr 1.89         Glucose, Serum: 120 mg/dL (06-02 @ 08:26)  Glucose, Serum: 107 mg/dL (06-01 @ 08:17)  Glucose, Serum: 95 mg/dL (05-31 @ 07:14)  Glucose, Serum: 127 mg/dL (05-30 @ 16:58)      02 Jun 2020 08:26    139    |  105    |  47     ----------------------------<  120    4.1     |  30     |  1.60   01 Jun 2020 08:17    140    |  105    |  46     ----------------------------<  107    4.5     |  27     |  1.76   31 May 2020 07:14    138    |  104    |  46     ----------------------------<  95     4.1     |  26     |  1.64   30 May 2020 16:58    138    |  103    |  47     ----------------------------<  127    4.4     |  26     |  1.89     Ca    9.6        02 Jun 2020 08:26  Ca    9.6        01 Jun 2020 08:17  Ca    9.6        31 May 2020 07:14  Ca    9.5        30 May 2020 16:58  Mg     2.6       30 May 2020 16:58    TPro  6.9    /  Alb  x      /  TBili  x      /  DBili  x      /  AST  x      /  ALT  x      /  AlkPhos  x      02 Jun 2020 10:54  TPro  7.1    /  Alb  3.2    /  TBili  1.3    /  DBili  x      /  AST  32     /  ALT  34     /  AlkPhos  361    30 May 2020 16:58              CAPILLARY BLOOD GLUCOSE      POCT Blood Glucose.: 260 mg/dL (02 Jun 2020 10:44)  POCT Blood Glucose.: 137 mg/dL (02 Jun 2020 07:15)  POCT Blood Glucose.: 146 mg/dL (01 Jun 2020 22:27)  POCT Blood Glucose.: 153 mg/dL (01 Jun 2020 16:13)          RADIOLOGY & ADDITIONAL TESTS:    Imaging Personally Reviewed:  [ ] YES  [ ] NO    Consultant(s) Notes Reviewed:  [ ] YES  [ ] NO    Care Discussed with Consultants/Other Providers [ ] YES  [ ] NO

## 2020-06-02 NOTE — DISCHARGE NOTE PROVIDER - PROVIDER TOKENS
FREE:[LAST:[PMD],PHONE:[(   )    -],FAX:[(   )    -],ADDRESS:[Dr Ignacio Vargas, PMD in 1 week],FOLLOWUP:[1 week]] FREE:[LAST:[PMD],PHONE:[(   )    -],FAX:[(   )    -],ADDRESS:[Dr Ignacio Vargas, PMD in 1 week],FOLLOWUP:[1 week]],PROVIDER:[TOKEN:[[id number]]]

## 2020-06-02 NOTE — OCCUPATIONAL THERAPY INITIAL EVALUATION ADULT - PERTINENT HX OF CURRENT PROBLEM, REHAB EVAL
85 F y/o hx of bl aorto femoral bypass, cad, htn, gerd presents with complains of abdominal pain. Patient had US of the kidney and Bladder on 6/1/20 which revealed partially visualized bilateral pleural effusions and small abdominal and pelvic ascites.

## 2020-06-02 NOTE — OCCUPATIONAL THERAPY INITIAL EVALUATION ADULT - STRENGTHENING, PT EVAL
Pt will increase BUE/BLE strength to 5/5 to improve functional strength needed to engage in functional tasks by 4 weeks

## 2020-06-02 NOTE — PHYSICAL THERAPY INITIAL EVALUATION ADULT - PLANNED THERAPY INTERVENTIONS, PT EVAL
transfer training/bed mobility training/gait training/strengthening/balance training
strengthening/gait training/bed mobility training/transfer training/balance training

## 2020-06-02 NOTE — PHYSICAL THERAPY INITIAL EVALUATION ADULT - ADDITIONAL COMMENTS
As per patient, she lives in an apartment with no steps to enter on the first floor. Patient reports that she owns a commode, rollator, and hospital bed. Patient with report of previous fall history.
As per patient, she lives in an apartment with no steps to enter on the first floor. Patient reports that she owns a commode, rollator, and hospital bed. Patient with report of previous fall history x3. Pt has been out of her home since March 2020.

## 2020-06-02 NOTE — DISCHARGE NOTE PROVIDER - HOSPITAL COURSE
85 year old female was sent from [facility] for distension of the abdomen. . 85 F y/o hx of bl aorto femoral bypass, cad, htn, gerd presents with complains of abdominal pain. Pt states she has noticed her abdomen has been distended and has gotten progressively worse with time. She states she has been moving bowel normally. She states she had COVID for an estimation of 5 weeks but at this time does not have it anymore.    patienet has hx of Fatty liver, non alcohol related.    /     Patient was seen by GI and renal . abd sono showed  atrophic left kydney  and cirrhosis of liver and small amount of ascites and bilateral small pleural effusions.      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 She was started on spironolactone and she improved. She had no respiratory distress. She has some gait imbalance, uses walker and PT recommended Home with PT. Her cardiac status is stable.     patient is followed by Dr Ignacio Vargas as out patient. 85 year old female was sent from [facility] for distension of the abdomen. . 85 F y/o hx of bl aorto femoral bypass, cad, htn, gerd presents with complains of abdominal pain. Pt states she has noticed her abdomen has been distended and has gotten progressively worse with time. She states she has been moving bowel normally. She states she had COVID for an estimation of 5 weeks but at this time does not have it anymore.    patienet has hx of Fatty liver, non alcohol related.    /     Patient was seen by GI and renal . abd sono showed  atrophic left kydney  and cirrhosis of liver and small amount of ascites and bilateral small pleural effusions.      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 She was started on spironolactone and she improved. She had no respiratory distress. She has some gait imbalance, uses walker and PT recommended Home with PT. She has UTI with enterobacter and staph and has been started on Bactrim and amoxicillin based on sensitivities. ( Her allergy to PCN is more than 10 years old and she had only rashes as a reaction)  Her cardiac status is stable.     patient is followed by Dr Ignacio Vargas as out patient.

## 2020-06-02 NOTE — PHYSICAL THERAPY INITIAL EVALUATION ADULT - GAIT TRAINING, PT EVAL
Pt will independently ambulate 250 feet with rolling walker without loss of balance, by 2-3 weeks
pt will be able to ambulate distances >500 feet for return to household ambulation and for short distances in the community x8 weeks

## 2020-06-02 NOTE — PHYSICAL THERAPY INITIAL EVALUATION ADULT - STRENGTHENING, PT EVAL
Patient will improve strength in B LE by 1 grade in 6-8 weeks to improve overall functional mobility including gait, transfers, bed mobility and decrease risk of falls.
pt will increase BUE/LE strength by full grade for ambulation, ADLs, transfers x8 weeks

## 2020-06-02 NOTE — DISCHARGE NOTE PROVIDER - REASON FOR ADMISSION
Anasarca, Diabetes, Cirrhosis of liver, HTN, ascites, ckd Anasarca, Diabetes, Cirrhosis of liver, HTN

## 2020-06-02 NOTE — PHYSICAL THERAPY INITIAL EVALUATION ADULT - CRITERIA FOR SKILLED THERAPEUTIC INTERVENTIONS
impairments found/rehab potential/anticipated discharge recommendation/therapy frequency/predicted duration of therapy intervention/anticipated equipment needs at discharge/functional limitations in following categories/risk reduction/prevention
anticipated discharge recommendation/therapy frequency/impairments found/rehab potential

## 2020-06-02 NOTE — OCCUPATIONAL THERAPY INITIAL EVALUATION ADULT - TRANSFER SAFETY CONCERNS NOTED: BED/CHAIR, REHAB EVAL
decreased balance during turns/decreased safety awareness/losing balance/decreased sequencing ability/decreased weight-shifting ability/decreased step length

## 2020-06-02 NOTE — PHYSICAL THERAPY INITIAL EVALUATION ADULT - PERTINENT HX OF CURRENT PROBLEM, REHAB EVAL
85 F y/o hx of bl aorto femoral bypass, cad, htn, gerd presents with complains of abdominal pain. Pt states she has noticed her abdomen has been distended and has gotten progressively worse with time. She states she has been moving bowel normally. She states she had COVID for an estimation of 5 weeks but at this time does not have it anymore.

## 2020-06-02 NOTE — DISCHARGE NOTE PROVIDER - NSDCMRMEDTOKEN_GEN_ALL_CORE_FT
Aspirin Enteric Coated 81 mg oral delayed release tablet: 1 tab(s) orally once a day  Basaglar KwikPen 100 units/mL subcutaneous solution: 12  subcutaneous once a day (at bedtime)  Colace 100 mg oral capsule: 1 cap(s) orally 2 times a day  ferrous sulfate 325 mg (65 mg elemental iron) oral tablet:   Flomax 0.4 mg oral capsule: 1 cap(s) orally once a day  gabapentin 100 mg oral tablet: orally 3 times a day  Lasix 40 mg oral tablet: 1 tab(s) orally once a day  Lipitor 20 mg oral tablet: 1 tab(s) orally once a day  metoprolol: 25 milligram(s) orally 2 times a day  Milk of Magnesia 8% oral suspension: 15 milliliter(s) orally once a day (at bedtime), As Needed  MiraLax oral powder for reconstitution: orally 2 times a day  Mylanta oral suspension:   Plavix 75 mg oral tablet: 1 tab(s) orally once a day  potassium chloride 10 mEq oral capsule, extended release: 1 cap(s) orally 2 times a day  Protonix 40 mg oral delayed release tablet: 1 tab(s) orally once a day  Senna 8.6 mg oral tablet:   spironolactone 25 mg oral tablet: 2 tab(s) orally once a day  tolnaftate 1% topical powder: Apply topically to affected area once a day  under breasts  Tylenol 325 mg oral tablet: 2 tab(s) orally every 4 hours  Ventolin 90 mcg/inh inhalation aerosol: inhaled 3 times a day  Vitamin C 500 mg oral tablet: 1 tab(s) orally once a day  Vitamin D3 1000 intl units (25 mcg) oral tablet: orally once a day Manuelalanta oral suspension:   Senna 8.6 mg oral tablet:   Tylenol 325 mg oral tablet: 2 tab(s) orally every 4 hours amoxicillin 500 mg oral capsule: 1 cap(s) orally every 12 hours   last date 6-13-20 ( aware that patiient&#x27;s allergy to PCN - only rashes) Monitor for rash  furosemide: 10 milligram(s) orally once a day  Mylanta oral suspension:   Senna 8.6 mg oral tablet:   sulfamethoxazole-trimethoprim 400 mg-80 mg oral tablet: 1 tab(s) orally every 12 hours  Last date 6-13-20  Tylenol 325 mg oral tablet: 2 tab(s) orally every 4 hours

## 2020-06-02 NOTE — PHYSICAL THERAPY INITIAL EVALUATION ADULT - IMPAIRMENTS FOUND, PT EVAL
aerobic capacity/endurance/muscle strength/gait, locomotion, and balance
gait, locomotion, and balance/muscle strength

## 2020-06-02 NOTE — DISCHARGE NOTE PROVIDER - NSDCCPCAREPLAN_GEN_ALL_CORE_FT
PRINCIPAL DISCHARGE DIAGNOSIS  Diagnosis: Other cirrhosis of liver  Assessment and Plan of Treatment: NAFLD, with ascites, mild pleural effusions. follow up with KWABENA Galvan in 1 week PRINCIPAL DISCHARGE DIAGNOSIS  Diagnosis: Other cirrhosis of liver  Assessment and Plan of Treatment: NAFLD, with ascites, mild pleural effusions. follow up with KWABENA Galvan in 1 week      SECONDARY DISCHARGE DIAGNOSES  Diagnosis: Acute UTI  Assessment and Plan of Treatment: enterobacter and staph. sensitive to only amox and bactrim.

## 2020-06-03 LAB
-  AMOXICILLIN/CLAVULANIC ACID: SIGNIFICANT CHANGE UP
-  AMPICILLIN/SULBACTAM: SIGNIFICANT CHANGE UP
-  AMPICILLIN: SIGNIFICANT CHANGE UP
-  CEFAZOLIN: SIGNIFICANT CHANGE UP
-  CEFTRIAXONE: SIGNIFICANT CHANGE UP
-  CIPROFLOXACIN: SIGNIFICANT CHANGE UP
-  DAPTOMYCIN: SIGNIFICANT CHANGE UP
-  GENTAMICIN: SIGNIFICANT CHANGE UP
-  LEVOFLOXACIN: SIGNIFICANT CHANGE UP
-  LINEZOLID: SIGNIFICANT CHANGE UP
-  NITROFURANTOIN: SIGNIFICANT CHANGE UP
-  OXACILLIN: SIGNIFICANT CHANGE UP
-  PENICILLIN: SIGNIFICANT CHANGE UP
-  RIFAMPIN: SIGNIFICANT CHANGE UP
-  TETRACYCLINE: SIGNIFICANT CHANGE UP
-  TRIMETHOPRIM/SULFAMETHOXAZOLE: SIGNIFICANT CHANGE UP
-  VANCOMYCIN: SIGNIFICANT CHANGE UP
GLUCOSE BLDC GLUCOMTR-MCNC: 124 MG/DL — HIGH (ref 70–99)
GLUCOSE BLDC GLUCOMTR-MCNC: 131 MG/DL — HIGH (ref 70–99)
GLUCOSE BLDC GLUCOMTR-MCNC: 161 MG/DL — HIGH (ref 70–99)
GLUCOSE BLDC GLUCOMTR-MCNC: 190 MG/DL — HIGH (ref 70–99)
M PROTEIN 24H UR ELPH-MRATE: SIGNIFICANT CHANGE UP
METHOD TYPE: SIGNIFICANT CHANGE UP
MITOCHONDRIA AB SER-ACNC: SIGNIFICANT CHANGE UP
SMOOTH MUSCLE AB SER-ACNC: ABNORMAL

## 2020-06-03 RX ORDER — FUROSEMIDE 40 MG
10 TABLET ORAL DAILY
Refills: 0 | Status: DISCONTINUED | OUTPATIENT
Start: 2020-06-03 | End: 2020-06-04

## 2020-06-03 RX ORDER — HYDROCORTISONE 1 %
1 OINTMENT (GRAM) TOPICAL
Refills: 0 | Status: DISCONTINUED | OUTPATIENT
Start: 2020-06-03 | End: 2020-06-04

## 2020-06-03 RX ORDER — NITROFURANTOIN MACROCRYSTAL 50 MG
100 CAPSULE ORAL
Refills: 0 | Status: DISCONTINUED | OUTPATIENT
Start: 2020-06-03 | End: 2020-06-03

## 2020-06-03 RX ORDER — AMOXICILLIN 250 MG/5ML
500 SUSPENSION, RECONSTITUTED, ORAL (ML) ORAL EVERY 12 HOURS
Refills: 0 | Status: DISCONTINUED | OUTPATIENT
Start: 2020-06-03 | End: 2020-06-04

## 2020-06-03 RX ADMIN — HEPARIN SODIUM 5000 UNIT(S): 5000 INJECTION INTRAVENOUS; SUBCUTANEOUS at 06:35

## 2020-06-03 RX ADMIN — Medication 81 MILLIGRAM(S): at 11:27

## 2020-06-03 RX ADMIN — SPIRONOLACTONE 50 MILLIGRAM(S): 25 TABLET, FILM COATED ORAL at 06:35

## 2020-06-03 RX ADMIN — ATORVASTATIN CALCIUM 20 MILLIGRAM(S): 80 TABLET, FILM COATED ORAL at 21:35

## 2020-06-03 RX ADMIN — Medication 25 MILLIGRAM(S): at 06:35

## 2020-06-03 RX ADMIN — Medication 1 APPLICATION(S): at 17:33

## 2020-06-03 RX ADMIN — Medication 500 MILLIGRAM(S): at 17:33

## 2020-06-03 RX ADMIN — CLOPIDOGREL BISULFATE 75 MILLIGRAM(S): 75 TABLET, FILM COATED ORAL at 11:28

## 2020-06-03 RX ADMIN — NYSTATIN CREAM 1 APPLICATION(S): 100000 CREAM TOPICAL at 17:34

## 2020-06-03 RX ADMIN — SIMETHICONE 80 MILLIGRAM(S): 80 TABLET, CHEWABLE ORAL at 06:35

## 2020-06-03 RX ADMIN — Medication 10 MILLIGRAM(S): at 17:33

## 2020-06-03 RX ADMIN — Medication 25 MILLIGRAM(S): at 17:34

## 2020-06-03 RX ADMIN — Medication 0: at 08:01

## 2020-06-03 RX ADMIN — SIMETHICONE 80 MILLIGRAM(S): 80 TABLET, CHEWABLE ORAL at 17:33

## 2020-06-03 RX ADMIN — NYSTATIN CREAM 1 APPLICATION(S): 100000 CREAM TOPICAL at 06:35

## 2020-06-03 RX ADMIN — HEPARIN SODIUM 5000 UNIT(S): 5000 INJECTION INTRAVENOUS; SUBCUTANEOUS at 17:33

## 2020-06-03 RX ADMIN — Medication 1 TABLET(S): at 17:33

## 2020-06-03 RX ADMIN — Medication 2: at 11:27

## 2020-06-03 RX ADMIN — TAMSULOSIN HYDROCHLORIDE 0.4 MILLIGRAM(S): 0.4 CAPSULE ORAL at 21:35

## 2020-06-03 RX ADMIN — Medication 1000 UNIT(S): at 11:27

## 2020-06-03 RX ADMIN — Medication 500 MILLIGRAM(S): at 11:28

## 2020-06-03 RX ADMIN — PANTOPRAZOLE SODIUM 40 MILLIGRAM(S): 20 TABLET, DELAYED RELEASE ORAL at 08:53

## 2020-06-03 RX ADMIN — Medication 2: at 16:30

## 2020-06-03 NOTE — PROGRESS NOTE ADULT - SUBJECTIVE AND OBJECTIVE BOX
Patient is a 85y old  Female who presents with a chief complaint of Anasarca, Diabetes, Cirrhosis of liver, HTN (02 Jun 2020 20:20)      HPI:  · HPI Objective Statement: 85 F y/o hx of bl aorto femoral bypass, cad, htn, gerd presents with complains of abdominal pain. Pt states she has noticed her abdomen has been distended and has gotten progressively worse with time. She states she has been moving bowel normally. She states she had COVID for an estimation of 5 weeks but at this time does not have it anymore. (30 May 2020 19:18)      INTERVAL HPI/OVERNIGHT EVENTS:  The patient denies melena, hematochezia, hematemesis, nausea, vomiting, abdominal pain, constipation, diarrhea, or change in bowel movements     MEDICATIONS  (STANDING):  ascorbic acid 500 milliGRAM(s) Oral daily  aspirin enteric coated 81 milliGRAM(s) Oral daily  atorvastatin 20 milliGRAM(s) Oral at bedtime  cholecalciferol 1000 Unit(s) Oral daily  clopidogrel Tablet 75 milliGRAM(s) Oral daily  dextrose 5%. 1000 milliLiter(s) (50 mL/Hr) IV Continuous <Continuous>  dextrose 50% Injectable 12.5 Gram(s) IV Push once  dextrose 50% Injectable 25 Gram(s) IV Push once  dextrose 50% Injectable 25 Gram(s) IV Push once  heparin   Injectable 5000 Unit(s) SubCutaneous every 12 hours  hydrocortisone 2.5% Rectal Cream 1 Application(s) Rectal two times a day  insulin lispro (HumaLOG) corrective regimen sliding scale   SubCutaneous three times a day before meals  insulin lispro (HumaLOG) corrective regimen sliding scale   SubCutaneous at bedtime  metoprolol tartrate 25 milliGRAM(s) Oral two times a day  nystatin Powder 1 Application(s) Topical two times a day  pantoprazole    Tablet 40 milliGRAM(s) Oral before breakfast  senna 2 Tablet(s) Oral at bedtime  simethicone 80 milliGRAM(s) Chew two times a day  spironolactone 50 milliGRAM(s) Oral daily  tamsulosin 0.4 milliGRAM(s) Oral at bedtime    MEDICATIONS  (PRN):  ALBUTerol    90 MICROgram(s) HFA Inhaler 2 Puff(s) Inhalation every 6 hours PRN Shortness of Breath and/or Wheezing  dextrose 40% Gel 15 Gram(s) Oral once PRN Blood Glucose LESS THAN 70 milliGRAM(s)/deciliter  glucagon  Injectable 1 milliGRAM(s) IntraMuscular once PRN Glucose LESS THAN 70 milligrams/deciliter  polyethylene glycol 3350 17 Gram(s) Oral two times a day PRN Constipation      FAMILY HISTORY:      Allergies    penicillin (Unknown)    Intolerances        PMH/PSH:  H/O cirrhosis  Falls  Cardiac complication  H/O aorto-femoral bypass  S/P cholecystectomy        REVIEW OF SYSTEMS:  CONSTITUTIONAL: No fever, weight loss,   EYES: No eye pain, visual disturbances, or discharge  ENMT:  No difficulty hearing, tinnitus, vertigo; No sinus or throat pain  NECK: No pain or stiffness  BREASTS: No pain, masses, or nipple discharge  RESPIRATORY: No cough, wheezing, chills or hemoptysis; No shortness of breath  CARDIOVASCULAR: No chest pain, palpitations, dizziness, or leg swelling  GASTROINTESTINAL:  See above   GENITOURINARY: No dysuria, frequency, hematuria, or incontinence  NEUROLOGICAL: No headaches, memory loss, loss of strength, numbness, or tremors  SKIN: No itching, burning, rashes, or lesions   LYMPH NODES: No enlarged glands  ENDOCRINE: No heat or cold intolerance; No hair loss  MUSCULOSKELETAL: No joint pain or swelling; No muscle, back, or extremity pain  PSYCHIATRIC: No depression, anxiety, mood swings, or difficulty sleeping  HEME/LYMPH: No easy bruising, or bleeding gums  ALLERGY AND IMMUNOLOGIC: No hives or eczema    Vital Signs Last 24 Hrs  T(C): 36.5 (03 Jun 2020 06:05), Max: 36.7 (02 Jun 2020 17:26)  T(F): 97.7 (03 Jun 2020 06:05), Max: 98.1 (02 Jun 2020 17:26)  HR: 72 (03 Jun 2020 06:05) (70 - 77)  BP: 124/50 (03 Jun 2020 06:05) (124/50 - 143/62)  BP(mean): --  RR: 18 (03 Jun 2020 06:05) (18 - 18)  SpO2: 99% (03 Jun 2020 06:05) (99% - 100%)    PHYSICAL EXAM:  GENERAL: NAD, well-groomed, well-developed  HEAD:  Atraumatic, Normocephalic  EYES: EOMI, PERRLA, conjunctiva and sclera clear  NECK: Supple, No JVD, Normal thyroid  NERVOUS SYSTEM:  Alert & Oriented X3, Good concentration;   CHEST/LUNG: Clear to percussion bilaterally; No rales, rhonchi, wheezing, or rubs  HEART: Regular rate and rhythm; No murmurs, rubs, or gallops  ABDOMEN: Soft, Nontender, Nondistended; Bowel sounds present  EXTREMITIES:  2+ Peripheral Pulses, No clubbing, cyanosis, or edema  LYMPH: No lymphadenopathy noted  SKIN: No rashes or lesions    LAB                          9.8    4.10  )-----------( 116      ( 02 Jun 2020 08:26 )             32.8       CBC:  06-02 @ 08:26  WBC 4.10   Hgb 9.8   Hct 32.8   Plts 116  MCV 97.0  06-01 @ 08:17  WBC 3.31   Hgb 9.4   Hct 30.8   Plts 111  MCV 95.7  05-31 @ 07:14  WBC 3.42   Hgb 9.1   Hct 30.7   Plts 115  MCV 98.1  05-30 @ 16:58  WBC 3.60   Hgb 9.4   Hct 30.8   Plts 117  MCV 95.1      Chemistry:  06-02 @ 08:26  Na+ 139  K+ 4.1  Cl- 105  CO2 30  BUN 47  Cr 1.60     06-01 @ 08:17  Na+ 140  K+ 4.5  Cl- 105  CO2 27  BUN 46  Cr 1.76     05-31 @ 07:14  Na+ 138  K+ 4.1  Cl- 104  CO2 26  BUN 46  Cr 1.64     05-30 @ 16:58  Na+ 138  K+ 4.4  Cl- 103  CO2 26  BUN 47  Cr 1.89         Glucose, Serum: 120 mg/dL (06-02 @ 08:26)  Glucose, Serum: 107 mg/dL (06-01 @ 08:17)  Glucose, Serum: 95 mg/dL (05-31 @ 07:14)  Glucose, Serum: 127 mg/dL (05-30 @ 16:58)      02 Jun 2020 08:26    139    |  105    |  47     ----------------------------<  120    4.1     |  30     |  1.60   01 Jun 2020 08:17    140    |  105    |  46     ----------------------------<  107    4.5     |  27     |  1.76   31 May 2020 07:14    138    |  104    |  46     ----------------------------<  95     4.1     |  26     |  1.64   30 May 2020 16:58    138    |  103    |  47     ----------------------------<  127    4.4     |  26     |  1.89     Ca    9.6        02 Jun 2020 08:26  Ca    9.6        01 Jun 2020 08:17  Ca    9.6        31 May 2020 07:14  Ca    9.5        30 May 2020 16:58  Mg     2.6       30 May 2020 16:58    TPro  6.9    /  Alb  3.7    /  TBili  x      /  DBili  x      /  AST  x      /  ALT  x      /  AlkPhos  x      02 Jun 2020 10:54  TPro  7.1    /  Alb  3.2    /  TBili  1.3    /  DBili  x      /  AST  32     /  ALT  34     /  AlkPhos  361    30 May 2020 16:58              CAPILLARY BLOOD GLUCOSE      POCT Blood Glucose.: 124 mg/dL (03 Jun 2020 07:19)  POCT Blood Glucose.: 142 mg/dL (02 Jun 2020 21:47)  POCT Blood Glucose.: 135 mg/dL (02 Jun 2020 15:37)  POCT Blood Glucose.: 130 mg/dL (02 Jun 2020 15:31)  POCT Blood Glucose.: 260 mg/dL (02 Jun 2020 10:44)          RADIOLOGY & ADDITIONAL TESTS:    Imaging Personally Reviewed:  [ ] YES  [ ] NO    Consultant(s) Notes Reviewed:  [ ] YES  [ ] NO    Care Discussed with Consultants/Other Providers [ ] YES  [ ] NO

## 2020-06-03 NOTE — PROGRESS NOTE ADULT - SUBJECTIVE AND OBJECTIVE BOX
Patient is a 85y old  Female who presents with a chief complaint of Anasarca, Diabetes, Cirrhosis of liver, HTN (03 Jun 2020 08:47)  patient has Uti- enterobacter f, staph - sensitive to  Macrobid   patient waiting for placement    INTERVAL HPI/OVERNIGHT EVENTS:  PAST MEDICAL & SURGICAL HISTORY:  H/O cirrhosis  Falls  Cardiac complication: Cad s/p cabg  H/O aorto-femoral bypass: Bilaeral  S/P cholecystectomy      MEDICATIONS  (STANDING):  ascorbic acid 500 milliGRAM(s) Oral daily  aspirin enteric coated 81 milliGRAM(s) Oral daily  atorvastatin 20 milliGRAM(s) Oral at bedtime  cholecalciferol 1000 Unit(s) Oral daily  clopidogrel Tablet 75 milliGRAM(s) Oral daily  dextrose 5%. 1000 milliLiter(s) (50 mL/Hr) IV Continuous <Continuous>  dextrose 50% Injectable 12.5 Gram(s) IV Push once  dextrose 50% Injectable 25 Gram(s) IV Push once  dextrose 50% Injectable 25 Gram(s) IV Push once  heparin   Injectable 5000 Unit(s) SubCutaneous every 12 hours  hydrocortisone 2.5% Rectal Cream 1 Application(s) Rectal two times a day  insulin lispro (HumaLOG) corrective regimen sliding scale   SubCutaneous three times a day before meals  insulin lispro (HumaLOG) corrective regimen sliding scale   SubCutaneous at bedtime  metoprolol tartrate 25 milliGRAM(s) Oral two times a day  nitrofurantoin monohydrate/macrocrystals (MACROBID) 100 milliGRAM(s) Oral two times a day with meals  nystatin Powder 1 Application(s) Topical two times a day  pantoprazole    Tablet 40 milliGRAM(s) Oral before breakfast  senna 2 Tablet(s) Oral at bedtime  simethicone 80 milliGRAM(s) Chew two times a day  spironolactone 50 milliGRAM(s) Oral daily  tamsulosin 0.4 milliGRAM(s) Oral at bedtime    MEDICATIONS  (PRN):  ALBUTerol    90 MICROgram(s) HFA Inhaler 2 Puff(s) Inhalation every 6 hours PRN Shortness of Breath and/or Wheezing  dextrose 40% Gel 15 Gram(s) Oral once PRN Blood Glucose LESS THAN 70 milliGRAM(s)/deciliter  glucagon  Injectable 1 milliGRAM(s) IntraMuscular once PRN Glucose LESS THAN 70 milligrams/deciliter  polyethylene glycol 3350 17 Gram(s) Oral two times a day PRN Constipation      Allergies    penicillin (Unknown)    Intolerances        REVIEW OF SYSTEMS:  CONSTITUTIONAL: No fever, weight loss, or fatigue  EYES: No eye pain, visual disturbances, or discharge  ENMT:  No difficulty hearing, tinnitus, vertigo; No sinus or throat pain  NECK: No pain or stiffness  BREASTS: No pain, masses, or nipple discharge  RESPIRATORY: No cough, wheezing, chills or hemoptysis; No shortness of breath  CARDIOVASCULAR: No chest pain, palpitations, dizziness, or leg swelling  GASTROINTESTINAL: No abdominal or epigastric pain. No nausea, vomiting, or hematemesis; No diarrhea or constipation. No melena or hematochezia.  GENITOURINARY: No dysuria, frequency, hematuria, or incontinence  NEUROLOGICAL: No headaches, memory loss, loss of strength, numbness, or tremors  SKIN: No itching, burning, rashes, or lesions   LYMPH NODES: No enlarged glands  ENDOCRINE: No heat or cold intolerance; No hair loss  MUSCULOSKELETAL: No joint pain or swelling; No muscle, back, or extremity pain  PSYCHIATRIC: No depression, anxiety, mood swings, or difficulty sleeping  HEME/LYMPH: No easy bruising, or bleeding gums  ALLERY AND IMMUNOLOGIC: No hives or eczema    Vital Signs Last 24 Hrs  T(C): 36.5 (03 Jun 2020 06:05), Max: 36.7 (02 Jun 2020 17:26)  T(F): 97.7 (03 Jun 2020 06:05), Max: 98.1 (02 Jun 2020 17:26)  HR: 72 (03 Jun 2020 06:05) (70 - 77)  BP: 124/50 (03 Jun 2020 06:05) (124/50 - 143/62)  BP(mean): --  RR: 18 (03 Jun 2020 06:05) (18 - 18)  SpO2: 99% (03 Jun 2020 06:05) (99% - 100%)    PHYSICAL EXAM:  GENERAL: NAD, well-groomed, well-developed  HEAD:  Atraumatic, Normocephalic  EYES: EOMI, PERRLA, conjunctiva and sclera clear  ENMT: No tonsillar erythema, exudates, or enlargement; Moist mucous membranes, Good dentition, No lesions  NECK: Supple, No JVD, Normal thyroid  NERVOUS SYSTEM:  Alert & Oriented X3, Good concentration; Motor Strength 5/5 B/L upper and lower extremities; DTRs 2+ intact and symmetric  CHEST/LUNG: Clear to percussion bilaterally; No rales, rhonchi, wheezing, or rubs  HEART: Regular rate and rhythm; No murmurs, rubs, or gallops  ABDOMEN: Soft, Nontender, Nondistended; Bowel sounds present  EXTREMITIES:  2+ Peripheral Pulses, No clubbing, cyanosis, or edema  LYMPH: No lymphadenopathy noted  SKIN: No rashes or lesions    LABS:                        9.8    4.10  )-----------( 116      ( 02 Jun 2020 08:26 )             32.8     06-02    139  |  105  |  47<H>  ----------------------------<  120<H>  4.1   |  30  |  1.60<H>    Ca    9.6      02 Jun 2020 08:26    TPro  6.9  /  Alb  3.7  /  TBili  x   /  DBili  x   /  AST  x   /  ALT  x   /  AlkPhos  x   06-02          CAPILLARY BLOOD GLUCOSE      POCT Blood Glucose.: 124 mg/dL (03 Jun 2020 07:19)  POCT Blood Glucose.: 142 mg/dL (02 Jun 2020 21:47)  POCT Blood Glucose.: 135 mg/dL (02 Jun 2020 15:37)  POCT Blood Glucose.: 130 mg/dL (02 Jun 2020 15:31)  POCT Blood Glucose.: 260 mg/dL (02 Jun 2020 10:44)                RADIOLOGY & ADDITIONAL TESTS:    Imaging Personally Reviewed:  [ ] YES  [ ] NO    Consultant(s) Notes Reviewed:  [ ] YES  [ ] NO    Care Discussed with Consultants/Other Providers [ ] YES  [ ] NO    Care discussed with family,         [  ]   yes  [  ]  No    imp:    stable[ ]    unstable[  ]     improving [  x ]       unchanged  [  ]                Plans:  Continue present plans  [ x ] will start macrobid for uti.                New consult [  ]   specialty  .......               order test[  ]    test name.                  Discharge Planning  [  ]

## 2020-06-03 NOTE — PROGRESS NOTE ADULT - SUBJECTIVE AND OBJECTIVE BOX
[facility], [facility]  NEPHROLOGY AND HYPERTENSION  [address]  [address]  [place] [address]  [phone]    MD YULISA CASTRO MD ANDREY GONCHARUK, MD MADHU KORRAPATI, MD YELENA ROSENBERG, MD BINNY KOSHY, MD CHRISTOPHER CAPUTO, MD EDWARD BOVER, MD          Patient events noted    MEDICATIONS  (STANDING):  amoxicillin 500 milliGRAM(s) Oral every 12 hours  ascorbic acid 500 milliGRAM(s) Oral daily  aspirin enteric coated 81 milliGRAM(s) Oral daily  atorvastatin 20 milliGRAM(s) Oral at bedtime  cholecalciferol 1000 Unit(s) Oral daily  clopidogrel Tablet 75 milliGRAM(s) Oral daily  dextrose 5%. 1000 milliLiter(s) (50 mL/Hr) IV Continuous <Continuous>  dextrose 50% Injectable 12.5 Gram(s) IV Push once  dextrose 50% Injectable 25 Gram(s) IV Push once  dextrose 50% Injectable 25 Gram(s) IV Push once  furosemide    Tablet 10 milliGRAM(s) Oral daily  heparin   Injectable 5000 Unit(s) SubCutaneous every 12 hours  hydrocortisone 2.5% Rectal Cream 1 Application(s) Rectal two times a day  insulin lispro (HumaLOG) corrective regimen sliding scale   SubCutaneous three times a day before meals  insulin lispro (HumaLOG) corrective regimen sliding scale   SubCutaneous at bedtime  metoprolol tartrate 25 milliGRAM(s) Oral two times a day  nystatin Powder 1 Application(s) Topical two times a day  pantoprazole    Tablet 40 milliGRAM(s) Oral before breakfast  senna 2 Tablet(s) Oral at bedtime  simethicone 80 milliGRAM(s) Chew two times a day  spironolactone 50 milliGRAM(s) Oral daily  tamsulosin 0.4 milliGRAM(s) Oral at bedtime  trimethoprim   80 mG/sulfamethoxazole 400 mG 1 Tablet(s) Oral every 12 hours    MEDICATIONS  (PRN):  ALBUTerol    90 MICROgram(s) HFA Inhaler 2 Puff(s) Inhalation every 6 hours PRN Shortness of Breath and/or Wheezing  dextrose 40% Gel 15 Gram(s) Oral once PRN Blood Glucose LESS THAN 70 milliGRAM(s)/deciliter  glucagon  Injectable 1 milliGRAM(s) IntraMuscular once PRN Glucose LESS THAN 70 milligrams/deciliter  polyethylene glycol 3350 17 Gram(s) Oral two times a day PRN Constipation      06-02-20 @ 07:01  -  06-03-20 @ 07:00  --------------------------------------------------------  IN: 600 mL / OUT: 1002 mL / NET: -402 mL      PHYSICAL EXAM:      T(C): 36.5 (06-03-20 @ 06:05), Max: 36.7 (06-02-20 @ 17:26)  HR: 59 (06-03-20 @ 12:15) (59 - 77)  BP: 137/70 (06-03-20 @ 12:15) (124/50 - 143/62)  RR: 18 (06-03-20 @ 06:05) (18 - 18)  SpO2: 94% (06-03-20 @ 12:15) (94% - 100%)  Wt(kg): --  Lungs clear  Heart S1S2  Abd soft NT ND  Extremities:   1-2 edema                                    9.8    4.10  )-----------( 116      ( 02 Jun 2020 08:26 )             32.8     06-02    139  |  105  |  47<H>  ----------------------------<  120<H>  4.1   |  30  |  1.60<H>    Ca    9.6      02 Jun 2020 08:26    TPro  6.9  /  Alb  3.7  /  TBili  x   /  DBili  x   /  AST  x   /  ALT  x   /  AlkPhos  x   06-02      LIVER FUNCTIONS - ( 02 Jun 2020 10:54 )  Alb: 3.7 g/dL / Pro: 6.9 g/dL / ALK PHOS: x     / ALT: x     / AST: x     / GGT: x           Creatinine Trend: 1.60<--, 1.76<--, 1.64<--, 1.89<--    Assessment   CKD 3-4; 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

## 2020-06-03 NOTE — PROGRESS NOTE ADULT - ASSESSMENT
HPI:  · HPI Objective Statement: 85 F y/o hx of bl aorto femoral bypass, cad, htn, gerd presents with complains of abdominal pain. Pt states she has noticed her abdomen has been distended and has gotten progressively worse with time. She states she has been moving bowel normally. She states she had COVID for an estimation of 5 weeks but at this time does not have it anymore. (30 May 2020 19:18)  --------------------- As Above ----------------------------------  Patient is a poor historian . Patient presents with upper abdominal pain best described as bloating. She feels like someone is trying to push out from inside the stomach. Started (?). History of diarrhea alternating with constipation. Patient denies having any BMs while in the hospital  Patient had a CT Scan on October 10/15/19 c/w cirrhosis On admission, patient had a CT Scan which revealed cirrhosis with anasarca. Mild amount of ascites is present.   Denies having alicia prior liver disease history. Denies ETOH abuse.  Patient denies having a colonoscopy  See labs / CT scan    Abdominal pain - Resolved  - Distention (-) , KUB negative.   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 HGB 9.8 stable Supportive care for now HPI:  · HPI Objective Statement: 85 F y/o hx of bl aorto femoral bypass, cad, htn, gerd presents with complains of abdominal pain. Pt states she has noticed her abdomen has been distended and has gotten progressively worse with time. She states she has been moving bowel normally. She states she had COVID for an estimation of 5 weeks but at this time does not have it anymore. (30 May 2020 19:18)  --------------------- As Above ----------------------------------  Patient is a poor historian . Patient presents with upper abdominal pain best described as bloating. She feels like someone is trying to push out from inside the stomach. Started (?). History of diarrhea alternating with constipation. Patient denies having any BMs while in the hospital  Patient had a CT Scan on October 10/15/19 c/w cirrhosis On admission, patient had a CT Scan which revealed cirrhosis with anasarca. Mild amount of ascites is present.   Denies having alicia prior liver disease history. Denies ETOH abuse.  Patient denies having a colonoscopy  See labs / CT scan    Abdominal pain - Resolved  - Distention (-) , KUB negative.   1)  EGD - patient refusing 2) simethicone 3)  D/C ASA  Cirrhosis - probably secondary to NAFLD VS congestive liver  Viral studies negative  BETSY, ASMA, AMA pending 1) Additional blood work pending 2) old chart 3) patient refusing EGD  Rectal bleeding - Probably hemorrhoids. HGB 9.8 stable.  Supportive care for now  ============ Patient stable from GI point of view for discharge

## 2020-06-04 ENCOUNTER — TRANSCRIPTION ENCOUNTER (OUTPATIENT)
Age: 85
End: 2020-06-04

## 2020-06-04 VITALS
HEART RATE: 60 BPM | TEMPERATURE: 98 F | RESPIRATION RATE: 16 BRPM | SYSTOLIC BLOOD PRESSURE: 146 MMHG | OXYGEN SATURATION: 98 % | DIASTOLIC BLOOD PRESSURE: 63 MMHG

## 2020-06-04 LAB
ANA PAT FLD IF-IMP: ABNORMAL
ANA TITR SER: ABNORMAL
CULTURE RESULTS: SIGNIFICANT CHANGE UP
GLUCOSE BLDC GLUCOMTR-MCNC: 115 MG/DL — HIGH (ref 70–99)
GLUCOSE BLDC GLUCOMTR-MCNC: 256 MG/DL — HIGH (ref 70–99)
ORGANISM # SPEC MICROSCOPIC CNT: SIGNIFICANT CHANGE UP
SARS-COV-2 RNA SPEC QL NAA+PROBE: SIGNIFICANT CHANGE UP
SPECIMEN SOURCE: SIGNIFICANT CHANGE UP

## 2020-06-04 RX ORDER — AMOXICILLIN 250 MG/5ML
1 SUSPENSION, RECONSTITUTED, ORAL (ML) ORAL
Qty: 0 | Refills: 0 | DISCHARGE
Start: 2020-06-04

## 2020-06-04 RX ORDER — FUROSEMIDE 40 MG
10 TABLET ORAL
Qty: 0 | Refills: 0 | DISCHARGE
Start: 2020-06-04

## 2020-06-04 RX ADMIN — CLOPIDOGREL BISULFATE 75 MILLIGRAM(S): 75 TABLET, FILM COATED ORAL at 11:22

## 2020-06-04 RX ADMIN — PANTOPRAZOLE SODIUM 40 MILLIGRAM(S): 20 TABLET, DELAYED RELEASE ORAL at 06:22

## 2020-06-04 RX ADMIN — Medication 1 DROP(S): at 14:35

## 2020-06-04 RX ADMIN — Medication 1 APPLICATION(S): at 06:21

## 2020-06-04 RX ADMIN — Medication 1 TABLET(S): at 06:22

## 2020-06-04 RX ADMIN — Medication 10 MILLIGRAM(S): at 06:29

## 2020-06-04 RX ADMIN — Medication 25 MILLIGRAM(S): at 06:22

## 2020-06-04 RX ADMIN — Medication 6: at 11:22

## 2020-06-04 RX ADMIN — Medication 500 MILLIGRAM(S): at 06:21

## 2020-06-04 RX ADMIN — SPIRONOLACTONE 50 MILLIGRAM(S): 25 TABLET, FILM COATED ORAL at 06:22

## 2020-06-04 RX ADMIN — SIMETHICONE 80 MILLIGRAM(S): 80 TABLET, CHEWABLE ORAL at 06:21

## 2020-06-04 RX ADMIN — NYSTATIN CREAM 1 APPLICATION(S): 100000 CREAM TOPICAL at 11:23

## 2020-06-04 RX ADMIN — Medication 1000 UNIT(S): at 11:22

## 2020-06-04 RX ADMIN — HEPARIN SODIUM 5000 UNIT(S): 5000 INJECTION INTRAVENOUS; SUBCUTANEOUS at 06:22

## 2020-06-04 RX ADMIN — Medication 500 MILLIGRAM(S): at 11:22

## 2020-06-04 RX ADMIN — Medication 81 MILLIGRAM(S): at 11:22

## 2020-06-04 NOTE — PROGRESS NOTE ADULT - SUBJECTIVE AND OBJECTIVE BOX
Patient is a 85y old  Female who presents with a chief complaint of Anasarca, Diabetes, Cirrhosis of liver, HTN (03 Jun 2020 13:19)  UTI with  enterobaster and staph patient symptomatic- Has been started on Amoxicillin and bactrim ( Based on Creatinine clearance)    INTERVAL HPI/OVERNIGHT EVENTS:  PAST MEDICAL & SURGICAL HISTORY:  H/O cirrhosis  Falls  Cardiac complication: Cad s/p cabg  H/O aorto-femoral bypass: Bilaeral  S/P cholecystectomy      MEDICATIONS  (STANDING):  amoxicillin 500 milliGRAM(s) Oral every 12 hours  ascorbic acid 500 milliGRAM(s) Oral daily  aspirin enteric coated 81 milliGRAM(s) Oral daily  atorvastatin 20 milliGRAM(s) Oral at bedtime  cholecalciferol 1000 Unit(s) Oral daily  clopidogrel Tablet 75 milliGRAM(s) Oral daily  dextrose 5%. 1000 milliLiter(s) (50 mL/Hr) IV Continuous <Continuous>  dextrose 50% Injectable 12.5 Gram(s) IV Push once  dextrose 50% Injectable 25 Gram(s) IV Push once  dextrose 50% Injectable 25 Gram(s) IV Push once  furosemide    Tablet 10 milliGRAM(s) Oral daily  heparin   Injectable 5000 Unit(s) SubCutaneous every 12 hours  hydrocortisone 2.5% Rectal Cream 1 Application(s) Rectal two times a day  insulin lispro (HumaLOG) corrective regimen sliding scale   SubCutaneous three times a day before meals  insulin lispro (HumaLOG) corrective regimen sliding scale   SubCutaneous at bedtime  metoprolol tartrate 25 milliGRAM(s) Oral two times a day  nystatin Powder 1 Application(s) Topical two times a day  pantoprazole    Tablet 40 milliGRAM(s) Oral before breakfast  senna 2 Tablet(s) Oral at bedtime  simethicone 80 milliGRAM(s) Chew two times a day  spironolactone 50 milliGRAM(s) Oral daily  tamsulosin 0.4 milliGRAM(s) Oral at bedtime  trimethoprim   80 mG/sulfamethoxazole 400 mG 1 Tablet(s) Oral every 12 hours    MEDICATIONS  (PRN):  ALBUTerol    90 MICROgram(s) HFA Inhaler 2 Puff(s) Inhalation every 6 hours PRN Shortness of Breath and/or Wheezing  dextrose 40% Gel 15 Gram(s) Oral once PRN Blood Glucose LESS THAN 70 milliGRAM(s)/deciliter  glucagon  Injectable 1 milliGRAM(s) IntraMuscular once PRN Glucose LESS THAN 70 milligrams/deciliter  polyethylene glycol 3350 17 Gram(s) Oral two times a day PRN Constipation      Allergies    penicillin (Rash)    Intolerances        REVIEW OF SYSTEMS:  CONSTITUTIONAL: No fever, weight loss, or fatigue  EYES: No eye pain, visual disturbances, or discharge  ENMT:  No difficulty hearing, tinnitus, vertigo; No sinus or throat pain  NECK: No pain or stiffness  BREASTS: No pain, masses, or nipple discharge  RESPIRATORY: No cough, wheezing, chills or hemoptysis; No shortness of breath  CARDIOVASCULAR: No chest pain, palpitations, dizziness, or leg swelling  GASTROINTESTINAL: No abdominal or epigastric pain. No nausea, vomiting, or hematemesis; No diarrhea or constipation. No melena or hematochezia.  GENITOURINARY: No dysuria, frequency, hematuria, or incontinence  NEUROLOGICAL: No headaches, memory loss, loss of strength, numbness, or tremors  SKIN: No itching, burning, rashes, or lesions   LYMPH NODES: No enlarged glands  ENDOCRINE: No heat or cold intolerance; No hair loss  MUSCULOSKELETAL: No joint pain or swelling; No muscle, back, or extremity pain  PSYCHIATRIC: No depression, anxiety, mood swings, or difficulty sleeping  HEME/LYMPH: No easy bruising, or bleeding gums  ALLERY AND IMMUNOLOGIC: No hives or eczema    Vital Signs Last 24 Hrs  T(C): 36.8 (04 Jun 2020 05:36), Max: 36.8 (03 Jun 2020 18:08)  T(F): 98.3 (04 Jun 2020 05:36), Max: 98.3 (03 Jun 2020 18:08)  HR: 78 (04 Jun 2020 05:36) (59 - 78)  BP: 117/56 (04 Jun 2020 05:36) (117/56 - 147/60)  BP(mean): --  RR: 18 (04 Jun 2020 05:36) (18 - 18)  SpO2: 95% (04 Jun 2020 05:36) (94% - 100%)    PHYSICAL EXAM:  GENERAL: NAD, well-groomed, well-developed  HEAD:  Atraumatic, Normocephalic  EYES: EOMI, PERRLA, conjunctiva and sclera clear  ENMT: No tonsillar erythema, exudates, or enlargement; Moist mucous membranes, Good dentition, No lesions  NECK: Supple, No JVD, Normal thyroid  NERVOUS SYSTEM:  Alert & Oriented X3, Good concentration; Motor Strength 5/5 B/L upper and lower extremities; DTRs 2+ intact and symmetric  CHEST/LUNG: Clear to percussion bilaterally; No rales, rhonchi, wheezing, or rubs  HEART: Regular rate and rhythm; No murmurs, rubs, or gallops  ABDOMEN: Soft, Nontender, Nondistended; Bowel sounds present  EXTREMITIES:  2+ Peripheral Pulses, No clubbing, cyanosis, or edema  LYMPH: No lymphadenopathy noted  SKIN: No rashes or lesions    LABS:        TPro  6.9  /  Alb  3.7  /  TBili  x   /  DBili  x   /  AST  x   /  ALT  x   /  AlkPhos  x   06-02          CAPILLARY BLOOD GLUCOSE      POCT Blood Glucose.: 115 mg/dL (04 Jun 2020 07:50)  POCT Blood Glucose.: 131 mg/dL (03 Jun 2020 22:08)  POCT Blood Glucose.: 161 mg/dL (03 Jun 2020 16:25)  POCT Blood Glucose.: 190 mg/dL (03 Jun 2020 10:50)                RADIOLOGY & ADDITIONAL TESTS:    Imaging Personally Reviewed:  [ ] YES  [ ] NO    Consultant(s) Notes Reviewed:  [ ] YES  [ ] NO    Care Discussed with Consultants/Other Providers [ ] YES  [ ] NO    Care discussed with family,         [  ]   yes  [  ]  No    imp:    stable[ ]    unstable[  ]     improving [ x  ]       unchanged  [  ]                Plans:  Continue present plans  [x  ] discharged to [facility]               New consult [  ]   specialty  .......               order test[  ]    test name.                  Discharge Planning  [  ]

## 2020-06-04 NOTE — PROGRESS NOTE ADULT - REASON FOR ADMISSION
Anasarca, Diabetes, Cirrhosis of liver, HTN

## 2020-06-10 DIAGNOSIS — K74.60 UNSPECIFIED CIRRHOSIS OF LIVER: ICD-10-CM

## 2020-06-10 DIAGNOSIS — K80.20 CALCULUS OF GALLBLADDER WITHOUT CHOLECYSTITIS WITHOUT OBSTRUCTION: ICD-10-CM

## 2020-06-10 DIAGNOSIS — I25.10 ATHEROSCLEROTIC HEART DISEASE OF NATIVE CORONARY ARTERY WITHOUT ANGINA PECTORIS: ICD-10-CM

## 2020-06-10 DIAGNOSIS — F17.210 NICOTINE DEPENDENCE, CIGARETTES, UNCOMPLICATED: ICD-10-CM

## 2020-06-10 DIAGNOSIS — E11.22 TYPE 2 DIABETES MELLITUS WITH DIABETIC CHRONIC KIDNEY DISEASE: ICD-10-CM

## 2020-06-10 DIAGNOSIS — Z95.1 PRESENCE OF AORTOCORONARY BYPASS GRAFT: ICD-10-CM

## 2020-06-10 DIAGNOSIS — I13.10 HYPERTENSIVE HEART AND CHRONIC KIDNEY DISEASE WITHOUT HEART FAILURE, WITH STAGE 1 THROUGH STAGE 4 CHRONIC KIDNEY DISEASE, OR UNSPECIFIED CHRONIC KIDNEY DISEASE: ICD-10-CM

## 2020-06-10 DIAGNOSIS — J90 PLEURAL EFFUSION, NOT ELSEWHERE CLASSIFIED: ICD-10-CM

## 2020-06-10 DIAGNOSIS — Z88.0 ALLERGY STATUS TO PENICILLIN: ICD-10-CM

## 2020-06-10 DIAGNOSIS — K75.81 NONALCOHOLIC STEATOHEPATITIS (NASH): ICD-10-CM

## 2020-06-10 DIAGNOSIS — J21.9 ACUTE BRONCHIOLITIS, UNSPECIFIED: ICD-10-CM

## 2020-06-10 DIAGNOSIS — N18.4 CHRONIC KIDNEY DISEASE, STAGE 4 (SEVERE): ICD-10-CM

## 2020-06-10 DIAGNOSIS — K21.9 GASTRO-ESOPHAGEAL REFLUX DISEASE WITHOUT ESOPHAGITIS: ICD-10-CM

## 2020-06-10 DIAGNOSIS — R16.0 HEPATOMEGALY, NOT ELSEWHERE CLASSIFIED: ICD-10-CM

## 2020-06-10 DIAGNOSIS — I10 ESSENTIAL (PRIMARY) HYPERTENSION: ICD-10-CM

## 2020-06-10 DIAGNOSIS — B96.89 OTHER SPECIFIED BACTERIAL AGENTS AS THE CAUSE OF DISEASES CLASSIFIED ELSEWHERE: ICD-10-CM

## 2020-06-10 DIAGNOSIS — A49.01 METHICILLIN SUSCEPTIBLE STAPHYLOCOCCUS AUREUS INFECTION, UNSPECIFIED SITE: ICD-10-CM

## 2020-06-10 DIAGNOSIS — Z11.59 ENCOUNTER FOR SCREENING FOR OTHER VIRAL DISEASES: ICD-10-CM

## 2020-06-10 DIAGNOSIS — R60.1 GENERALIZED EDEMA: ICD-10-CM

## 2020-06-10 DIAGNOSIS — R18.8 OTHER ASCITES: ICD-10-CM

## 2020-06-10 DIAGNOSIS — N30.90 CYSTITIS, UNSPECIFIED WITHOUT HEMATURIA: ICD-10-CM

## 2020-07-04 ENCOUNTER — OUTPATIENT (OUTPATIENT)
Dept: OUTPATIENT SERVICES | Facility: HOSPITAL | Age: 85
LOS: 1 days | Discharge: HOME | End: 2020-07-04
Payer: MEDICARE

## 2020-07-04 DIAGNOSIS — M25.512 PAIN IN LEFT SHOULDER: ICD-10-CM

## 2020-07-04 DIAGNOSIS — Z90.49 ACQUIRED ABSENCE OF OTHER SPECIFIED PARTS OF DIGESTIVE TRACT: Chronic | ICD-10-CM

## 2020-07-04 DIAGNOSIS — Z95.828 PRESENCE OF OTHER VASCULAR IMPLANTS AND GRAFTS: Chronic | ICD-10-CM
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
## 2020-07-05 PROBLEM — Z87.19 PERSONAL HISTORY OF OTHER DISEASES OF THE DIGESTIVE SYSTEM: Chronic | Status: ACTIVE | Noted: 2020-05-30

## 2020-07-05 PROBLEM — I51.9 HEART DISEASE, UNSPECIFIED: Chronic | Status: ACTIVE | Noted: 2020-05-30

## 2020-07-06 LAB — SARS-COV-2 RNA SPEC QL NAA+PROBE: SIGNIFICANT CHANGE UP

## 2020-11-24 NOTE — PROGRESS NOTE ADULT - ASSESSMENT
HPI:  · HPI Objective Statement: 85 F y/o hx of bl aorto femoral bypass, cad, htn, gerd presents with complains of abdominal pain. Pt states she has noticed her abdomen has been distended and has gotten progressively worse with time. She states she has been moving bowel normally. She states she had COVID for an estimation of 5 weeks but at this time does not have it anymore. (30 May 2020 19:18)  --------------------- As Above ----------------------------------  Patient is a poor historian . Patient presents with upper abdominal pain best described as bloating. She feels like someone is trying to push out from inside the stomach. Started (?). History of diarrhea alternating with constipation. Patient denies having any BMs while in the hospital  Patient had a CT Scan on October 10/15/19 c/w cirrhosis On admission, patient had a CT Scan which revealed cirrhosis with anasarca. Mild amount of ascites is present.   Denies having alicia prior liver disease history. Denies ETOH abuse.  Patient denies having a colonoscopy  See labs / CT scan    Abdominal pain - Distention (-) , KUB negative.   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 Supportive care for now Highly limited secondary to impaired ability/willingness to follow commands. BUE grossly at least 2+/5. BLE at least 2-/5.

## 2020-12-07 ENCOUNTER — OUTPATIENT (OUTPATIENT)
Dept: OUTPATIENT SERVICES | Facility: HOSPITAL | Age: 85
LOS: 1 days | Discharge: HOME | End: 2020-12-07
Payer: MEDICARE

## 2020-12-07 DIAGNOSIS — R93.5 ABNORMAL FINDINGS ON DIAGNOSTIC IMAGING OF OTHER ABDOMINAL REGIONS, INCLUDING RETROPERITONEUM: ICD-10-CM

## 2020-12-07 DIAGNOSIS — Z95.828 PRESENCE OF OTHER VASCULAR IMPLANTS AND GRAFTS: Chronic | ICD-10-CM

## 2020-12-07 DIAGNOSIS — Z90.49 ACQUIRED ABSENCE OF OTHER SPECIFIED PARTS OF DIGESTIVE TRACT: Chronic | ICD-10-CM

## 2021-01-12 ENCOUNTER — RESULT REVIEW (OUTPATIENT)
Age: 86
End: 2021-01-12

## 2021-01-14 ENCOUNTER — EMERGENCY (EMERGENCY)
Facility: HOSPITAL | Age: 86
LOS: 1 days | Discharge: HOME | End: 2021-01-14
Attending: EMERGENCY MEDICINE
Payer: MEDICARE

## 2021-01-14 VITALS
WEIGHT: 130.07 LBS | HEIGHT: 61 IN | RESPIRATION RATE: 17 BRPM | SYSTOLIC BLOOD PRESSURE: 109 MMHG | DIASTOLIC BLOOD PRESSURE: 67 MMHG | TEMPERATURE: 98 F | OXYGEN SATURATION: 98 % | HEART RATE: 81 BPM

## 2021-01-14 VITALS
RESPIRATION RATE: 16 BRPM | OXYGEN SATURATION: 98 % | SYSTOLIC BLOOD PRESSURE: 115 MMHG | TEMPERATURE: 98 F | HEART RATE: 72 BPM | DIASTOLIC BLOOD PRESSURE: 69 MMHG

## 2021-01-14 DIAGNOSIS — I82.413 ACUTE EMBOLISM AND THROMBOSIS OF FEMORAL VEIN, BILATERAL: Chronic | ICD-10-CM

## 2021-01-14 DIAGNOSIS — Z95.0 PRESENCE OF CARDIAC PACEMAKER: Chronic | ICD-10-CM

## 2021-01-14 DIAGNOSIS — Z95.1 PRESENCE OF AORTOCORONARY BYPASS GRAFT: Chronic | ICD-10-CM

## 2021-01-14 LAB
APPEARANCE UR: CLEAR — SIGNIFICANT CHANGE UP
BACTERIA # UR AUTO: NEGATIVE — SIGNIFICANT CHANGE UP
BILIRUB UR-MCNC: NEGATIVE — SIGNIFICANT CHANGE UP
COLOR SPEC: YELLOW — SIGNIFICANT CHANGE UP
DIFF PNL FLD: NEGATIVE — SIGNIFICANT CHANGE UP
EPI CELLS # UR: 7 /HPF — HIGH
GLUCOSE UR QL: NEGATIVE — SIGNIFICANT CHANGE UP
HYALINE CASTS # UR AUTO: 3 /LPF — HIGH (ref 0–2)
KETONES UR-MCNC: NEGATIVE — SIGNIFICANT CHANGE UP
LEUKOCYTE ESTERASE UR-ACNC: ABNORMAL
NITRITE UR-MCNC: NEGATIVE — SIGNIFICANT CHANGE UP
PH UR: 6 — SIGNIFICANT CHANGE UP (ref 5–8)
PROT UR-MCNC: ABNORMAL
RBC CASTS # UR COMP ASSIST: 3 /HPF — SIGNIFICANT CHANGE UP (ref 0–4)
SP GR SPEC: 1.02 — SIGNIFICANT CHANGE UP (ref 1.01–1.02)
UROBILINOGEN FLD QL: NEGATIVE — SIGNIFICANT CHANGE UP
WBC UR QL: 30 /HPF — HIGH (ref 0–5)

## 2021-01-14 PROCEDURE — 99284 EMERGENCY DEPT VISIT MOD MDM: CPT | Mod: 25

## 2021-01-14 PROCEDURE — 70450 CT HEAD/BRAIN W/O DYE: CPT | Mod: 26

## 2021-01-14 PROCEDURE — 99284 EMERGENCY DEPT VISIT MOD MDM: CPT

## 2021-01-14 PROCEDURE — 81001 URINALYSIS AUTO W/SCOPE: CPT

## 2021-01-14 PROCEDURE — 87186 SC STD MICRODIL/AGAR DIL: CPT

## 2021-01-14 PROCEDURE — 87086 URINE CULTURE/COLONY COUNT: CPT

## 2021-01-14 PROCEDURE — 70450 CT HEAD/BRAIN W/O DYE: CPT

## 2021-01-14 RX ORDER — CEFPODOXIME PROXETIL 100 MG
100 TABLET ORAL ONCE
Refills: 0 | Status: COMPLETED | OUTPATIENT
Start: 2021-01-14 | End: 2021-01-14

## 2021-01-14 RX ADMIN — Medication 100 MILLIGRAM(S): at 09:59

## 2021-01-14 NOTE — ED PROVIDER NOTE - NSFOLLOWUPINSTRUCTIONS_ED_ALL_ED_FT
Follow up with your PCP in 24-48 hours.   Patient will need to continue a course of Cefpodoxime for UTI.   Return to the ER if you develop any new or worsening symptoms such as fever, chest pain, shortness of breath, numbness, weakness, abdominal pain, nausea, vomiting, or visual changes.

## 2021-01-14 NOTE — ED PROVIDER NOTE - OBJECTIVE STATEMENT
85F with PMH including HTN, HLD, DM, CAD presents to the ER with fatigue and body aches since receiving the COVID vaccine yesterday. Pt denies any other symptoms including chest pain, flank pain, SOB, abd pain, N/V/D, HA. Of note, was placed on Macrobid 2 days ago for a UTI. Pt states her dysuria has since resolved. Per paperwork sent over from [facility], the pt was slightly altered from baseline this morning. On arrival to ED, A&Ox3.

## 2021-01-14 NOTE — ED PROVIDER NOTE - NS ED ROS FT
ROS:  GENERAL: +fatigue and body aches. No fever, no chills  EYES: no change in vision  HEENT: no trouble swallowing, no trouble speaking  CARDIAC: no chest pain  PULMONARY: no cough, no shortness of breath  GI: no abdominal pain, no nausea, no vomiting, no diarrhea, no constipation  : No dysuria, no frequency, no change in appearance, or odor of urine  SKIN: no rashes  NEURO: no headache, no weakness  MSK: No joint pain    Shilo Abraham PGY3

## 2021-01-14 NOTE — ED PROVIDER NOTE - PATIENT PORTAL LINK FT
You can access the FollowMyHealth Patient Portal offered by [facility] by registering at the following website: [email or web address]. By joining [facility]’s FollowMyHealth portal, you will also be able to view your health information using other applications (apps) compatible with our system.

## 2021-01-14 NOTE — ED ADULT NURSE REASSESSMENT NOTE - NS ED NURSE REASSESS COMMENT FT1
report given to Renata LE from [facility] rehab, made aware pt is to return to facility

## 2021-01-14 NOTE — ED PROVIDER NOTE - PSH
DVT of deep femoral vein, bilateral  s/p aortic bifemoral bypass  Pacemaker  AICD/PPM  S/P CABG (coronary artery bypass graft)

## 2021-01-14 NOTE — ED PROVIDER NOTE - CLINICAL SUMMARY MEDICAL DECISION MAKING FREE TEXT BOX
Fatigues and body aches 1 day after COVID vaccine. Denies any other current symptoms. Exam/history c/w expected symptoms following COVID vaccine. Low suspicion for any intracranial causes of the pt's symptoms but per [facility]'s request will screen with head CT as pt reportedly had an episode of AMS this morning. On abx for recent UTI, states urinary symptoms are all resolved. On macrobid current which is not an optimal abx choice. Will switch to 3rd generation cephalosporin.

## 2021-01-14 NOTE — ED PROVIDER NOTE - PHYSICAL EXAMINATION
Gen: AAOx3, non-toxic  Head: NCAT  HEENT: EOMI, oral mucosa moist, normal conjunctiva  Lung: CTAB, no respiratory distress, no wheezes/rhonchi/rales B/L, speaking in full sentences  CV: RRR, no murmurs, rubs or gallops  Abd: soft, NTND, no guarding, no CVA tenderness  MSK: no visible deformities  Neuro: No focal sensory or motor deficits  Skin: Warm, well perfused, no rash  Psych: normal affect.     Shilo Abraham PGY3

## 2021-01-14 NOTE — ED PROVIDER NOTE - ATTENDING CONTRIBUTION TO CARE
attending Bina: 85yF h/o cirrhosis, arthritis, HTN, HLD, DM, CAD, s/p covid vaccine yesterday sent in from rehab facility for fatigue and body aches since receiving the vaccine. Decreased PO intake and reportedly confused earlier today, as per paperwork sent in for head ct. On arrival, pt well appearing, VSS, alert and oriented x 3, nonfocal neuro exam. Of note, started on macrobid for UTI 2 days ago, pt was reporting dysuria only somewhat improved. Prelim cx results growing proteus.

## 2021-01-14 NOTE — ED ADULT NURSE NOTE - OBJECTIVE STATEMENT
85 year old female pt presented to the ED via EMS from [facility] rehab stating pt received covid vaccination yesterday and is feeling increasingly weak and fatigue, pt is a bed bound pt A&OX3, states she has no energy and is fatigue, pt states she has pain all over her body  , denies any chest pain no shortness of breath, as per ems pt was O2 saturation at 93%RA but now 98% on 2 L, pt MAEx4 lung field with expiratory wheeze, pt has a do not use extremity band on the right arm due to extreme arthritis to right shoulder, states extreme pain when touched or moved

## 2021-01-14 NOTE — ED PROVIDER NOTE - PMH
Afib    CAD (coronary artery disease)    Diabetes    HTN (hypertension)    Hyperlipidemia    Hyperthyroidism    Spinal stenosis

## 2021-01-16 NOTE — ED POST DISCHARGE NOTE - ADDITIONAL DOCUMENTATION
1/16/21: UCx PRELIM >100k GNR, pt DC'ed on cefpodoxime (sent back to [facility] with instructions for change in Rx), pending sensitivities to determine if need for call back vs appropriate care received. -Marlo Rodríguez PA-C

## 2021-01-16 NOTE — ED POST DISCHARGE NOTE - DETAILS
spoke with louann oates at [facility], states still taking macrobid, recommended switching to cefpodoxime, rn took verbal for rx  - Leandra Stallings PA-C

## 2021-01-17 LAB
-  AMIKACIN: SIGNIFICANT CHANGE UP
-  AMOXICILLIN/CLAVULANIC ACID: SIGNIFICANT CHANGE UP
-  AMPICILLIN/SULBACTAM: SIGNIFICANT CHANGE UP
-  AMPICILLIN: SIGNIFICANT CHANGE UP
-  AZTREONAM: SIGNIFICANT CHANGE UP
-  CEFAZOLIN: SIGNIFICANT CHANGE UP
-  CEFEPIME: SIGNIFICANT CHANGE UP
-  CEFOXITIN: SIGNIFICANT CHANGE UP
-  CEFTRIAXONE: SIGNIFICANT CHANGE UP
-  CIPROFLOXACIN: SIGNIFICANT CHANGE UP
-  ERTAPENEM: SIGNIFICANT CHANGE UP
-  GENTAMICIN: SIGNIFICANT CHANGE UP
-  LEVOFLOXACIN: SIGNIFICANT CHANGE UP
-  MEROPENEM: SIGNIFICANT CHANGE UP
-  NITROFURANTOIN: SIGNIFICANT CHANGE UP
-  PIPERACILLIN/TAZOBACTAM: SIGNIFICANT CHANGE UP
-  TOBRAMYCIN: SIGNIFICANT CHANGE UP
-  TRIMETHOPRIM/SULFAMETHOXAZOLE: SIGNIFICANT CHANGE UP
METHOD TYPE: SIGNIFICANT CHANGE UP

## 2021-01-18 LAB
CULTURE RESULTS: SIGNIFICANT CHANGE UP
ORGANISM # SPEC MICROSCOPIC CNT: SIGNIFICANT CHANGE UP
ORGANISM # SPEC MICROSCOPIC CNT: SIGNIFICANT CHANGE UP
SPECIMEN SOURCE: SIGNIFICANT CHANGE UP

## 2021-04-28 NOTE — OCCUPATIONAL THERAPY INITIAL EVALUATION ADULT - LLE MMT, REHAB EVAL
Relevant Problems   CARDIOVASCULAR   (+) Essential hypertension      ENDOCRINE   (+) Primary hypothyroidism       Anesthetic History   No history of anesthetic complications            Review of Systems / Medical History  Patient summary reviewed and pertinent labs reviewed    Pulmonary  Within defined limits                 Neuro/Psych         Neuromuscular disease (parkinsons)     Cardiovascular    Hypertension                   GI/Hepatic/Renal  Within defined limits              Endo/Other      Hypothyroidism       Other Findings            Physical Exam    Airway  Mallampati: II  TM Distance: 4 - 6 cm  Neck ROM: normal range of motion   Mouth opening: Normal     Cardiovascular    Rhythm: regular  Rate: normal         Dental         Pulmonary  Breath sounds clear to auscultation               Abdominal         Other Findings            Anesthetic Plan    ASA: 2  Anesthesia type: total IV anesthesia          Induction: Intravenous  Anesthetic plan and risks discussed with: Patient and Spouse
Grossly equal to or greater then 3/5 throughout.

## 2022-01-03 NOTE — OCCUPATIONAL THERAPY INITIAL EVALUATION ADULT - LEVEL OF INDEPENDENCE: SCOOT/BRIDGE, REHAB EVAL
stand-by assist Erythromycin Counseling:  I discussed with the patient the risks of erythromycin including but not limited to GI upset, allergic reaction, drug rash, diarrhea, increase in liver enzymes, and yeast infections.

## 2023-07-03 NOTE — ED ADULT NURSE NOTE - NSFALLRSKHARMRISK_ED_ALL_ED
Last office visit date: 06/01/2023    Next appointment scheduled?: Yes , 02/16/2024    Number of refills given: 0, new prescription, refill appropriate?    Routed to provider's pool to review, thanks.     
yes
